# Patient Record
Sex: MALE | Race: WHITE | NOT HISPANIC OR LATINO | Employment: FULL TIME | URBAN - METROPOLITAN AREA
[De-identification: names, ages, dates, MRNs, and addresses within clinical notes are randomized per-mention and may not be internally consistent; named-entity substitution may affect disease eponyms.]

---

## 2023-03-27 LAB
ANION GAP IN SER/PLAS: 13 MMOL/L (ref 10–20)
CALCIUM (MG/DL) IN SER/PLAS: 9.6 MG/DL (ref 8.6–10.3)
CARBON DIOXIDE, TOTAL (MMOL/L) IN SER/PLAS: 34 MMOL/L (ref 21–32)
CHLORIDE (MMOL/L) IN SER/PLAS: 99 MMOL/L (ref 98–107)
CREATININE (MG/DL) IN SER/PLAS: 1.08 MG/DL (ref 0.5–1.3)
ERYTHROCYTE DISTRIBUTION WIDTH (RATIO) BY AUTOMATED COUNT: 13 % (ref 11.5–14.5)
ERYTHROCYTE MEAN CORPUSCULAR HEMOGLOBIN CONCENTRATION (G/DL) BY AUTOMATED: 33.5 G/DL (ref 32–36)
ERYTHROCYTE MEAN CORPUSCULAR VOLUME (FL) BY AUTOMATED COUNT: 98 FL (ref 80–100)
ERYTHROCYTES (10*6/UL) IN BLOOD BY AUTOMATED COUNT: 5.31 X10E12/L (ref 4.5–5.9)
GFR MALE: 75 ML/MIN/1.73M2
GLUCOSE (MG/DL) IN SER/PLAS: 89 MG/DL (ref 74–99)
HEMATOCRIT (%) IN BLOOD BY AUTOMATED COUNT: 52 % (ref 41–52)
HEMOGLOBIN (G/DL) IN BLOOD: 17.4 G/DL (ref 13.5–17.5)
LEUKOCYTES (10*3/UL) IN BLOOD BY AUTOMATED COUNT: 6.7 X10E9/L (ref 4.4–11.3)
PLATELETS (10*3/UL) IN BLOOD AUTOMATED COUNT: 217 X10E9/L (ref 150–450)
POTASSIUM (MMOL/L) IN SER/PLAS: 3.7 MMOL/L (ref 3.5–5.3)
SODIUM (MMOL/L) IN SER/PLAS: 142 MMOL/L (ref 136–145)
UREA NITROGEN (MG/DL) IN SER/PLAS: 28 MG/DL (ref 6–23)

## 2023-04-07 ENCOUNTER — HOSPITAL ENCOUNTER (OUTPATIENT)
Dept: DATA CONVERSION | Facility: HOSPITAL | Age: 68
End: 2023-04-07
Attending: INTERNAL MEDICINE | Admitting: INTERNAL MEDICINE
Payer: COMMERCIAL

## 2023-04-07 DIAGNOSIS — I25.119 ATHEROSCLEROTIC HEART DISEASE OF NATIVE CORONARY ARTERY WITH UNSPECIFIED ANGINA PECTORIS (CMS-HCC): ICD-10-CM

## 2023-04-07 DIAGNOSIS — J44.9 CHRONIC OBSTRUCTIVE PULMONARY DISEASE, UNSPECIFIED (MULTI): ICD-10-CM

## 2023-04-07 DIAGNOSIS — E55.9 VITAMIN D DEFICIENCY, UNSPECIFIED: ICD-10-CM

## 2023-04-07 DIAGNOSIS — I10 ESSENTIAL (PRIMARY) HYPERTENSION: ICD-10-CM

## 2023-04-07 DIAGNOSIS — F17.200 NICOTINE DEPENDENCE, UNSPECIFIED, UNCOMPLICATED: ICD-10-CM

## 2023-04-07 DIAGNOSIS — I20.9 ANGINA PECTORIS, UNSPECIFIED (CMS-HCC): ICD-10-CM

## 2023-04-07 DIAGNOSIS — E78.2 MIXED HYPERLIPIDEMIA: ICD-10-CM

## 2023-04-07 DIAGNOSIS — Z85.46 PERSONAL HISTORY OF MALIGNANT NEOPLASM OF PROSTATE: ICD-10-CM

## 2023-04-07 DIAGNOSIS — I25.10 ATHEROSCLEROTIC HEART DISEASE OF NATIVE CORONARY ARTERY WITHOUT ANGINA PECTORIS: ICD-10-CM

## 2023-04-07 DIAGNOSIS — I44.2 ATRIOVENTRICULAR BLOCK, COMPLETE (MULTI): ICD-10-CM

## 2023-04-07 DIAGNOSIS — I25.2 OLD MYOCARDIAL INFARCTION: ICD-10-CM

## 2023-04-10 LAB
ATRIAL RATE: 71 BPM
P AXIS: 40 DEGREES
P OFFSET: 160 MS
P ONSET: 95 MS
PR INTERVAL: 186 MS
Q ONSET: 188 MS
QRS COUNT: 12 BEATS
QRS DURATION: 174 MS
QT INTERVAL: 456 MS
QTC CALCULATION(BAZETT): 495 MS
QTC FREDERICIA: 482 MS
R AXIS: -80 DEGREES
T AXIS: 73 DEGREES
T OFFSET: 416 MS
VENTRICULAR RATE: 71 BPM

## 2023-06-12 LAB
ALANINE AMINOTRANSFERASE (SGPT) (U/L) IN SER/PLAS: 17 U/L (ref 10–52)
ALBUMIN (G/DL) IN SER/PLAS: 4.4 G/DL (ref 3.4–5)
ALKALINE PHOSPHATASE (U/L) IN SER/PLAS: 59 U/L (ref 33–136)
ANION GAP IN SER/PLAS: 11 MMOL/L (ref 10–20)
ASPARTATE AMINOTRANSFERASE (SGOT) (U/L) IN SER/PLAS: 17 U/L (ref 9–39)
BILIRUBIN TOTAL (MG/DL) IN SER/PLAS: 0.4 MG/DL (ref 0–1.2)
CALCIDIOL (25 OH VITAMIN D3) (NG/ML) IN SER/PLAS: 32 NG/ML
CALCIUM (MG/DL) IN SER/PLAS: 9.8 MG/DL (ref 8.6–10.3)
CARBON DIOXIDE, TOTAL (MMOL/L) IN SER/PLAS: 38 MMOL/L (ref 21–32)
CHLORIDE (MMOL/L) IN SER/PLAS: 96 MMOL/L (ref 98–107)
CHOLESTEROL (MG/DL) IN SER/PLAS: 124 MG/DL (ref 0–199)
CHOLESTEROL IN HDL (MG/DL) IN SER/PLAS: 34 MG/DL
CHOLESTEROL/HDL RATIO: 3.6
CREATININE (MG/DL) IN SER/PLAS: 1.11 MG/DL (ref 0.5–1.3)
ERYTHROCYTE DISTRIBUTION WIDTH (RATIO) BY AUTOMATED COUNT: 13.1 % (ref 11.5–14.5)
ERYTHROCYTE MEAN CORPUSCULAR HEMOGLOBIN CONCENTRATION (G/DL) BY AUTOMATED: 33.1 G/DL (ref 32–36)
ERYTHROCYTE MEAN CORPUSCULAR VOLUME (FL) BY AUTOMATED COUNT: 99 FL (ref 80–100)
ERYTHROCYTES (10*6/UL) IN BLOOD BY AUTOMATED COUNT: 5.28 X10E12/L (ref 4.5–5.9)
GFR MALE: 73 ML/MIN/1.73M2
GLUCOSE (MG/DL) IN SER/PLAS: 100 MG/DL (ref 74–99)
HEMATOCRIT (%) IN BLOOD BY AUTOMATED COUNT: 52.3 % (ref 41–52)
HEMOGLOBIN (G/DL) IN BLOOD: 17.3 G/DL (ref 13.5–17.5)
LDL: 64 MG/DL (ref 0–99)
LEUKOCYTES (10*3/UL) IN BLOOD BY AUTOMATED COUNT: 5.7 X10E9/L (ref 4.4–11.3)
PLATELETS (10*3/UL) IN BLOOD AUTOMATED COUNT: 218 X10E9/L (ref 150–450)
POTASSIUM (MMOL/L) IN SER/PLAS: 4.1 MMOL/L (ref 3.5–5.3)
PROTEIN TOTAL: 7.4 G/DL (ref 6.4–8.2)
SODIUM (MMOL/L) IN SER/PLAS: 141 MMOL/L (ref 136–145)
THYROTROPIN (MIU/L) IN SER/PLAS BY DETECTION LIMIT <= 0.05 MIU/L: 2.07 MIU/L (ref 0.44–3.98)
TRIGLYCERIDE (MG/DL) IN SER/PLAS: 129 MG/DL (ref 0–149)
UREA NITROGEN (MG/DL) IN SER/PLAS: 26 MG/DL (ref 6–23)
VLDL: 26 MG/DL (ref 0–40)

## 2023-06-15 LAB
PROSTATE SPECIFIC AG (NG/ML) IN SER/PLAS: 0.9 NG/ML (ref 0–4)
PROSTATE SPECIFIC AG FREE (NG/ML) IN SER/PLAS: <0.1 NG/ML
PROSTATE SPECIFIC AG FREE/PROSTATE SPECIFIC AG TOTAL IN SER/PLAS: <11 %

## 2023-09-06 VITALS — BODY MASS INDEX: 35.59 KG/M2 | WEIGHT: 248.02 LBS

## 2023-09-14 NOTE — H&P
History & Physical Reviewed:   I have reviewed the History and Physical dated:  27-Mar-2023   History and Physical reviewed and relevant findings noted. Patient examined to review pertinent physical  findings.: No significant changes   Home Medications Reviewed: no changes noted   Allergies Reviewed: no changes noted       Airway/Sedation Assessment:  ·  Mouth Opening OK yes   ·  Neck Flexibility OK yes   ·  Loose Teeth no   ·  Oropharyngeal Classification Class I   ·  ASA PS Classification ASA III   ·  Sedation Plan moderate sedation       ERAS (Enhanced Recovery After Surgery):  ·  ERAS Patient: no     Consent:   COVID-19 Consent:  ·  COVID-19 Risk Consent Surgeon has reviewed key risks related to the risk of keshav COVID-19 and if they contract COVID-19 what the risks are.       Electronic Signatures:  Agustin Esteban)  (Signed 07-Apr-2023 07:46)   Authored: History & Physical Reviewed, Airway/Sedation,  ERAS, Consent, Note Completion      Last Updated: 07-Apr-2023 07:46 by Agustin Esteban)

## 2023-09-16 PROBLEM — J44.9 CHRONIC OBSTRUCTIVE PULMONARY DISEASE (MULTI): Status: ACTIVE | Noted: 2023-09-16

## 2023-09-16 PROBLEM — M48.061 LUMBAR SPINAL STENOSIS: Status: ACTIVE | Noted: 2023-09-16

## 2023-09-16 PROBLEM — N43.0 ENCYSTED HYDROCELE: Status: ACTIVE | Noted: 2023-09-16

## 2023-09-16 PROBLEM — M47.816 DEGENERATIVE JOINT DISEASE (DJD) OF LUMBAR SPINE: Status: ACTIVE | Noted: 2023-09-16

## 2023-09-16 PROBLEM — I87.2 CHRONIC VENOUS INSUFFICIENCY: Status: ACTIVE | Noted: 2023-09-16

## 2023-09-16 PROBLEM — M25.619 DECREASED RANGE OF MOTION OF SHOULDER: Status: ACTIVE | Noted: 2023-09-16

## 2023-09-16 PROBLEM — I25.10 CAD S/P PERCUTANEOUS CORONARY ANGIOPLASTY: Status: ACTIVE | Noted: 2023-09-16

## 2023-09-16 PROBLEM — M54.10 RADICULOPATHY: Status: ACTIVE | Noted: 2023-09-16

## 2023-09-16 PROBLEM — F17.200 CURRENT EVERY DAY SMOKER: Status: ACTIVE | Noted: 2023-09-16

## 2023-09-16 PROBLEM — E78.2 MIXED HYPERLIPIDEMIA: Status: ACTIVE | Noted: 2023-09-16

## 2023-09-16 PROBLEM — I87.2 VENOUS DERMATITIS: Status: ACTIVE | Noted: 2023-09-16

## 2023-09-16 PROBLEM — E66.812 CLASS 2 OBESITY WITH BODY MASS INDEX (BMI) OF 35.0 TO 35.9 IN ADULT: Status: ACTIVE | Noted: 2023-09-16

## 2023-09-16 PROBLEM — I10 ESSENTIAL HYPERTENSION: Status: ACTIVE | Noted: 2023-09-16

## 2023-09-16 PROBLEM — Z85.46 HISTORY OF PROSTATE CANCER: Status: ACTIVE | Noted: 2023-09-16

## 2023-09-16 PROBLEM — M54.9 CHRONIC BACK PAIN: Status: ACTIVE | Noted: 2023-09-16

## 2023-09-16 PROBLEM — G89.29 CHRONIC BACK PAIN: Status: ACTIVE | Noted: 2023-09-16

## 2023-09-16 PROBLEM — E55.9 VITAMIN D DEFICIENCY: Status: ACTIVE | Noted: 2023-09-16

## 2023-09-16 PROBLEM — N40.0 ENLARGED PROSTATE: Status: ACTIVE | Noted: 2023-09-16

## 2023-09-16 PROBLEM — I44.2 COMPLETE HEART BLOCK (MULTI): Status: ACTIVE | Noted: 2023-09-16

## 2023-09-16 PROBLEM — Z95.0 PACEMAKER: Status: ACTIVE | Noted: 2023-09-16

## 2023-09-16 PROBLEM — Z91.199 NONCOMPLIANCE WITH TREATMENT: Status: ACTIVE | Noted: 2023-09-16

## 2023-09-16 PROBLEM — Z95.828 H/O ENDOVASCULAR STENT GRAFT FOR ABDOMINAL AORTIC ANEURYSM: Status: ACTIVE | Noted: 2023-09-16

## 2023-09-16 PROBLEM — I71.40 ABDOMINAL AORTIC ANEURYSM (AAA) (CMS-HCC): Status: ACTIVE | Noted: 2023-09-16

## 2023-09-16 PROBLEM — Z98.61 CAD S/P PERCUTANEOUS CORONARY ANGIOPLASTY: Status: ACTIVE | Noted: 2023-09-16

## 2023-09-16 PROBLEM — E66.9 CLASS 2 OBESITY WITH BODY MASS INDEX (BMI) OF 35.0 TO 35.9 IN ADULT: Status: ACTIVE | Noted: 2023-09-16

## 2023-09-16 PROBLEM — N52.9 ERECTILE DYSFUNCTION: Status: ACTIVE | Noted: 2023-09-16

## 2023-09-16 PROBLEM — I25.2 HISTORY OF MI (MYOCARDIAL INFARCTION): Status: ACTIVE | Noted: 2023-09-16

## 2023-09-16 PROBLEM — M19.012 ARTHRITIS OF LEFT SHOULDER REGION: Status: ACTIVE | Noted: 2023-09-16

## 2023-09-16 PROBLEM — I45.10 RIGHT BUNDLE BRANCH BLOCK (RBBB): Status: ACTIVE | Noted: 2023-09-16

## 2023-09-16 PROBLEM — I87.8 VENOUS STASIS: Status: ACTIVE | Noted: 2023-09-16

## 2023-09-16 RX ORDER — LOSARTAN POTASSIUM 50 MG/1
50 TABLET ORAL DAILY
COMMUNITY
End: 2023-11-09 | Stop reason: ALTCHOICE

## 2023-09-16 RX ORDER — BUDESONIDE AND FORMOTEROL FUMARATE DIHYDRATE 160; 4.5 UG/1; UG/1
AEROSOL RESPIRATORY (INHALATION)
COMMUNITY

## 2023-09-16 RX ORDER — SILDENAFIL CITRATE 20 MG/1
20 TABLET ORAL AS NEEDED
COMMUNITY
Start: 2021-09-08 | End: 2023-11-09 | Stop reason: ALTCHOICE

## 2023-09-16 RX ORDER — LOSARTAN POTASSIUM 50 MG/1
50 TABLET ORAL DAILY
COMMUNITY
End: 2024-01-08

## 2023-09-16 RX ORDER — CARVEDILOL 25 MG/1
25 TABLET ORAL
COMMUNITY

## 2023-09-16 RX ORDER — DEXTROMETHORPHAN HYDROBROMIDE, GUAIFENESIN 5; 100 MG/5ML; MG/5ML
LIQUID ORAL
COMMUNITY

## 2023-09-16 RX ORDER — ACETAMINOPHEN 500 MG
50 TABLET ORAL DAILY
COMMUNITY
End: 2024-03-13

## 2023-09-16 RX ORDER — SILDENAFIL 50 MG/1
50 TABLET, FILM COATED ORAL DAILY PRN
COMMUNITY
End: 2023-12-12 | Stop reason: SDUPTHER

## 2023-09-16 RX ORDER — CETIRIZINE HYDROCHLORIDE 10 MG/1
1 TABLET ORAL DAILY PRN
COMMUNITY

## 2023-09-16 RX ORDER — CYCLOBENZAPRINE HCL 5 MG
5 TABLET ORAL 2 TIMES DAILY PRN
COMMUNITY
End: 2023-11-09 | Stop reason: ALTCHOICE

## 2023-09-16 RX ORDER — ALBUTEROL SULFATE 90 UG/1
2 AEROSOL, METERED RESPIRATORY (INHALATION) EVERY 4 HOURS PRN
COMMUNITY
Start: 2020-12-10 | End: 2023-12-12 | Stop reason: SDUPTHER

## 2023-09-16 RX ORDER — AMLODIPINE BESYLATE 5 MG/1
1 TABLET ORAL NIGHTLY
COMMUNITY
Start: 2023-04-07 | End: 2023-10-24 | Stop reason: SDUPTHER

## 2023-09-16 RX ORDER — IBUPROFEN 200 MG
1-4 TABLET ORAL 3 TIMES DAILY PRN
COMMUNITY

## 2023-09-16 RX ORDER — ATORVASTATIN CALCIUM 40 MG/1
1 TABLET, FILM COATED ORAL NIGHTLY
COMMUNITY
Start: 2020-12-11 | End: 2023-10-10

## 2023-09-16 RX ORDER — MECLIZINE HYDROCHLORIDE 25 MG/1
25 TABLET ORAL 3 TIMES DAILY PRN
COMMUNITY
Start: 2023-01-24 | End: 2023-11-09 | Stop reason: ALTCHOICE

## 2023-09-16 RX ORDER — AMLODIPINE BESYLATE 2.5 MG/1
2.5 TABLET ORAL EVERY EVENING
COMMUNITY
Start: 2023-03-27 | End: 2023-10-24 | Stop reason: SDUPTHER

## 2023-09-16 RX ORDER — NAPROXEN SODIUM 220 MG/1
81 TABLET, FILM COATED ORAL DAILY
COMMUNITY

## 2023-09-16 RX ORDER — TAMSULOSIN HYDROCHLORIDE 0.4 MG/1
0.4 CAPSULE ORAL NIGHTLY
COMMUNITY
End: 2024-01-08

## 2023-09-16 RX ORDER — HYDROCHLOROTHIAZIDE 25 MG/1
25 TABLET ORAL DAILY
COMMUNITY
End: 2023-10-11

## 2023-10-04 DIAGNOSIS — E78.2 MIXED HYPERLIPIDEMIA: ICD-10-CM

## 2023-10-05 DIAGNOSIS — I10 ESSENTIAL HYPERTENSION: ICD-10-CM

## 2023-10-10 RX ORDER — ATORVASTATIN CALCIUM 40 MG/1
40 TABLET, FILM COATED ORAL NIGHTLY
Qty: 90 TABLET | Refills: 3 | Status: SHIPPED | OUTPATIENT
Start: 2023-10-10

## 2023-10-11 RX ORDER — HYDROCHLOROTHIAZIDE 25 MG/1
25 TABLET ORAL DAILY
Qty: 90 TABLET | Refills: 3 | Status: SHIPPED | OUTPATIENT
Start: 2023-10-11

## 2023-10-18 ENCOUNTER — APPOINTMENT (OUTPATIENT)
Dept: CARDIOLOGY | Facility: CLINIC | Age: 68
End: 2023-10-18
Payer: COMMERCIAL

## 2023-10-18 ENCOUNTER — APPOINTMENT (OUTPATIENT)
Dept: CARDIOLOGY | Facility: HOSPITAL | Age: 68
End: 2023-10-18
Payer: COMMERCIAL

## 2023-10-24 DIAGNOSIS — I10 ESSENTIAL HYPERTENSION: ICD-10-CM

## 2023-10-24 RX ORDER — AMLODIPINE BESYLATE 5 MG/1
5 TABLET ORAL NIGHTLY
Qty: 90 TABLET | Refills: 3 | Status: SHIPPED | OUTPATIENT
Start: 2023-10-24 | End: 2024-10-23

## 2023-10-24 RX ORDER — AMLODIPINE BESYLATE 5 MG/1
TABLET ORAL
Qty: 90 TABLET | Refills: 3 | Status: CANCELLED | OUTPATIENT
Start: 2023-10-24

## 2023-11-06 ENCOUNTER — APPOINTMENT (OUTPATIENT)
Dept: CARDIOLOGY | Facility: CLINIC | Age: 68
End: 2023-11-06
Payer: COMMERCIAL

## 2023-11-09 ENCOUNTER — OFFICE VISIT (OUTPATIENT)
Dept: CARDIOLOGY | Facility: CLINIC | Age: 68
End: 2023-11-09
Payer: COMMERCIAL

## 2023-11-09 VITALS
BODY MASS INDEX: 37.47 KG/M2 | DIASTOLIC BLOOD PRESSURE: 78 MMHG | WEIGHT: 247.2 LBS | HEART RATE: 80 BPM | HEIGHT: 68 IN | SYSTOLIC BLOOD PRESSURE: 130 MMHG

## 2023-11-09 DIAGNOSIS — I71.43 INFRARENAL ABDOMINAL AORTIC ANEURYSM (AAA) WITHOUT RUPTURE (CMS-HCC): ICD-10-CM

## 2023-11-09 DIAGNOSIS — F17.200 CURRENT EVERY DAY SMOKER: ICD-10-CM

## 2023-11-09 DIAGNOSIS — I87.2 CHRONIC VENOUS INSUFFICIENCY OF LOWER EXTREMITY: ICD-10-CM

## 2023-11-09 DIAGNOSIS — I10 ESSENTIAL HYPERTENSION: ICD-10-CM

## 2023-11-09 DIAGNOSIS — Z95.828 H/O ENDOVASCULAR STENT GRAFT FOR ABDOMINAL AORTIC ANEURYSM: ICD-10-CM

## 2023-11-09 DIAGNOSIS — I25.10 CAD S/P PERCUTANEOUS CORONARY ANGIOPLASTY: ICD-10-CM

## 2023-11-09 DIAGNOSIS — E78.2 MIXED HYPERLIPIDEMIA: ICD-10-CM

## 2023-11-09 DIAGNOSIS — J44.9 CHRONIC OBSTRUCTIVE PULMONARY DISEASE, UNSPECIFIED COPD TYPE (MULTI): ICD-10-CM

## 2023-11-09 DIAGNOSIS — Z98.61 CAD S/P PERCUTANEOUS CORONARY ANGIOPLASTY: ICD-10-CM

## 2023-11-09 DIAGNOSIS — Z95.0 PACEMAKER: ICD-10-CM

## 2023-11-09 PROCEDURE — 3008F BODY MASS INDEX DOCD: CPT | Performed by: INTERNAL MEDICINE

## 2023-11-09 PROCEDURE — 3075F SYST BP GE 130 - 139MM HG: CPT | Performed by: INTERNAL MEDICINE

## 2023-11-09 PROCEDURE — 1159F MED LIST DOCD IN RCRD: CPT | Performed by: INTERNAL MEDICINE

## 2023-11-09 PROCEDURE — 99214 OFFICE O/P EST MOD 30 MIN: CPT | Performed by: INTERNAL MEDICINE

## 2023-11-09 PROCEDURE — 3078F DIAST BP <80 MM HG: CPT | Performed by: INTERNAL MEDICINE

## 2023-11-09 NOTE — PROGRESS NOTES
CARDIOLOGY OFFICE VISIT      CHIEF COMPLAINT      HISTORY OF PRESENT ILLNESS  The patient states that he has been doing well.  He denies chest discomfort or symptoms of myocardial ischemia.  He has not used nitroglycerin.  He has his usual chronic dyspnea secondary to COPD.  He denies any worsening of this.  He denies palpitations and syncope.  He denies any problems medications.  I talked him about getting an ultrasound of his abdominal aorta to follow-up his endovascular stent graft repair of his infrarenal abdominal aneurysm but he states that he does not wish to have this done at this time since its always been good when he sat checked before and he is getting to retire he does not want to pay the cost.      Impression:  Permanent pacemaker  Coronary artery disease, new onset angina, progressive and disabling, recommend cardiac catheterization and possible PCI, please see above  Non-STEMI May 2020  PCI with CLARI of circumflex coronary 5/29/2020  Endovascular stent graft repair of infrarenal abdominal aneurysm and right common iliac artery aneurysm on 8/21/2020  Hypertension  Hyperlipidemia  Tobacco abuse  COPD  Chronic venous insufficiency of lower extremities bilaterally  Family history of CAD  Obesity  History of prostate cancer, status post radiation therapy        Please excuse any errors in grammar or translation related to this dictation. Voice recognition software was utilized to prepare this document.     Past Medical History  Past Medical History:   Diagnosis Date    Encounter for general adult medical examination without abnormal findings     Encounter for wellness examination    Encounter for general adult medical examination without abnormal findings 08/08/2022    Encounter for wellness examination    Encounter for immunization     Encounter for immunization    Encounter for routine child health examination without abnormal findings     Well child examination    Encounter for screening for malignant  neoplasm of colon     Screening for colon cancer    Encounter for screening for malignant neoplasm of colon     Colon cancer screening    Encounter for screening for malignant neoplasm of prostate     Screening PSA (prostate specific antigen)    Hyperventilation 05/13/2022    Labored breathing    Obesity, unspecified 10/12/2022    Class 1 obesity with body mass index (BMI) of 34.0 to 34.9 in adult    Pain in right hip 04/04/2022    Hip pain, bilateral    Pain in right knee 03/16/2022    Pain in both knees    Patient's noncompliance with other medical treatment and regimen due to unspecified reason     Noncompliance with treatment    Patient's noncompliance with other medical treatment and regimen due to unspecified reason 05/16/2022    Noncompliance by declining service    Personal history of malignant neoplasm of prostate     History of malignant neoplasm of prostate    Personal history of malignant neoplasm of prostate 03/16/2022    History of prostate cancer    Personal history of other diseases of the circulatory system     History of second degree heart block    Personal history of other diseases of the circulatory system 04/11/2022    History of uncontrolled hypertension    Personal history of other endocrine, nutritional and metabolic disease 03/16/2022    History of obesity    Personal history of other specified conditions 06/03/2022    History of syncope       Social History  Social History     Tobacco Use    Smoking status: Every Day     Packs/day: 1     Types: Cigarettes     Start date: 1970    Smokeless tobacco: Never   Substance Use Topics    Alcohol use: Yes     Comment: socially 1-2x week    Drug use: Never       Family History     Family History   Problem Relation Name Age of Onset    Coronary artery disease Mother      Other (Cardiac Pacemaker) Mother      Coronary artery disease Father      Other (pacemaker) Father      Coronary artery disease Sister          Allergies:  No Known Allergies      Outpatient Medications:  Current Outpatient Medications   Medication Instructions    acetaminophen (Tylenol 8 HOUR) 650 mg ER tablet TAKE AS NEEDED AS DIRECTED    albuterol 90 mcg/actuation inhaler 2 puffs, inhalation, Every 4 hours PRN    amLODIPine (NORVASC) 5 mg, oral, Nightly, (Increase in dose)    aspirin 81 mg, oral, Daily    atorvastatin (LIPITOR) 40 mg, oral, Nightly    budesonide-formoteroL (Symbicort) 160-4.5 mcg/actuation inhaler Symbicort 160 mcg-4.5 mcg/inh inhalation aerosol   Quantity: 0   Refills: 0   Ordered: 24-Jan-2023  Mary Chang Generic Substitution Allowed    carvedilol (COREG) 25 mg, oral, 2 times daily with meals    cetirizine (ZyrTEC) 10 mg tablet 1 tablet, oral, Daily PRN    cholecalciferol (VITAMIN D-3) 50 mcg, oral, Daily    Flomax 0.4 mg, oral, Nightly    hydroCHLOROthiazide (HYDRODIURIL) 25 mg, oral, Daily    ibuprofen 200 mg tablet 1-4 tablets, oral, 3 times daily PRN    losartan (COZAAR) 50 mg, oral, 2 times daily    sildenafil (VIAGRA) 50 mg, oral, Daily PRN          REVIEW OF SYSTEMS  Review of Systems   All other systems reviewed and are negative.        VITALS  Vitals:    11/09/23 1548   BP: 130/78   Pulse: 80       PHYSICAL EXAM  Constitutional:       Appearance: Healthy appearance. Not in distress.   Neck:      Vascular: No JVR. JVD normal.   Pulmonary:      Effort: Pulmonary effort is normal.      Breath sounds: No wheezing. No rhonchi. No rales.      Comments: Decreased bilateral breath sounds  Chest:      Chest wall: Not tender to palpatation.   Cardiovascular:      PMI at left midclavicular line. Normal rate. Regular rhythm. Normal S1. Normal S2.       Murmurs: There is no murmur.      No gallop.  No click. No rub.   Pulses:     Intact distal pulses.   Edema:     Peripheral edema absent.   Abdominal:      General: Bowel sounds are normal.      Palpations: Abdomen is soft.      Tenderness: There is no abdominal tenderness.   Musculoskeletal: Normal range of motion.          General: No tenderness. Skin:     General: Skin is warm and dry.   Neurological:      General: No focal deficit present.      Mental Status: Alert and oriented to person, place and time.           ASSESSMENT AND PLAN  Diagnoses and all orders for this visit:  Pacemaker  CAD S/P percutaneous coronary angioplasty  Essential hypertension  Mixed hyperlipidemia  Chronic obstructive pulmonary disease, unspecified COPD type (CMS/HCC)  Current every day smoker  Chronic venous insufficiency of lower extremity  Infrarenal abdominal aortic aneurysm (AAA) without rupture (CMS/[facility])  H/O endovascular stent graft for abdominal aortic aneurysm      [unfilled]

## 2023-12-06 ENCOUNTER — LAB (OUTPATIENT)
Dept: LAB | Facility: LAB | Age: 68
End: 2023-12-06
Payer: COMMERCIAL

## 2023-12-06 ENCOUNTER — TELEPHONE (OUTPATIENT)
Dept: PRIMARY CARE | Facility: CLINIC | Age: 68
End: 2023-12-06

## 2023-12-06 DIAGNOSIS — I10 ESSENTIAL (PRIMARY) HYPERTENSION: Primary | ICD-10-CM

## 2023-12-06 LAB
ALBUMIN SERPL BCP-MCNC: 4.3 G/DL (ref 3.4–5)
ALP SERPL-CCNC: 59 U/L (ref 33–136)
ALT SERPL W P-5'-P-CCNC: 16 U/L (ref 10–52)
ANION GAP SERPL CALC-SCNC: 7 MMOL/L (ref 10–20)
AST SERPL W P-5'-P-CCNC: 13 U/L (ref 9–39)
BILIRUB SERPL-MCNC: 0.5 MG/DL (ref 0–1.2)
BUN SERPL-MCNC: 19 MG/DL (ref 6–23)
CALCIUM SERPL-MCNC: 9.5 MG/DL (ref 8.6–10.3)
CHLORIDE SERPL-SCNC: 98 MMOL/L (ref 98–107)
CO2 SERPL-SCNC: 40 MMOL/L (ref 21–32)
CREAT SERPL-MCNC: 1.06 MG/DL (ref 0.5–1.3)
GFR SERPL CREATININE-BSD FRML MDRD: 77 ML/MIN/1.73M*2
GLUCOSE SERPL-MCNC: 106 MG/DL (ref 74–99)
POTASSIUM SERPL-SCNC: 4 MMOL/L (ref 3.5–5.3)
PROT SERPL-MCNC: 6.8 G/DL (ref 6.4–8.2)
SODIUM SERPL-SCNC: 141 MMOL/L (ref 136–145)

## 2023-12-06 PROCEDURE — 80053 COMPREHEN METABOLIC PANEL: CPT

## 2023-12-06 PROCEDURE — 36415 COLL VENOUS BLD VENIPUNCTURE: CPT

## 2023-12-12 ENCOUNTER — OFFICE VISIT (OUTPATIENT)
Dept: PRIMARY CARE | Facility: CLINIC | Age: 68
End: 2023-12-12
Payer: COMMERCIAL

## 2023-12-12 VITALS
WEIGHT: 250 LBS | OXYGEN SATURATION: 91 % | DIASTOLIC BLOOD PRESSURE: 84 MMHG | BODY MASS INDEX: 37.03 KG/M2 | HEIGHT: 69 IN | SYSTOLIC BLOOD PRESSURE: 134 MMHG | TEMPERATURE: 98 F | HEART RATE: 76 BPM

## 2023-12-12 DIAGNOSIS — I10 ESSENTIAL HYPERTENSION: ICD-10-CM

## 2023-12-12 DIAGNOSIS — J44.9 CHRONIC OBSTRUCTIVE PULMONARY DISEASE, UNSPECIFIED COPD TYPE (MULTI): ICD-10-CM

## 2023-12-12 DIAGNOSIS — E66.09 CLASS 2 OBESITY DUE TO EXCESS CALORIES WITHOUT SERIOUS COMORBIDITY WITH BODY MASS INDEX (BMI) OF 35.0 TO 35.9 IN ADULT: ICD-10-CM

## 2023-12-12 DIAGNOSIS — N52.9 ERECTILE DYSFUNCTION, UNSPECIFIED ERECTILE DYSFUNCTION TYPE: ICD-10-CM

## 2023-12-12 DIAGNOSIS — E78.2 MIXED HYPERLIPIDEMIA: Primary | ICD-10-CM

## 2023-12-12 DIAGNOSIS — Z85.46 HISTORY OF PROSTATE CANCER: ICD-10-CM

## 2023-12-12 PROCEDURE — 3008F BODY MASS INDEX DOCD: CPT | Performed by: INTERNAL MEDICINE

## 2023-12-12 PROCEDURE — 3079F DIAST BP 80-89 MM HG: CPT | Performed by: INTERNAL MEDICINE

## 2023-12-12 PROCEDURE — 99214 OFFICE O/P EST MOD 30 MIN: CPT | Performed by: INTERNAL MEDICINE

## 2023-12-12 PROCEDURE — 3075F SYST BP GE 130 - 139MM HG: CPT | Performed by: INTERNAL MEDICINE

## 2023-12-12 PROCEDURE — 1159F MED LIST DOCD IN RCRD: CPT | Performed by: INTERNAL MEDICINE

## 2023-12-12 PROCEDURE — 1160F RVW MEDS BY RX/DR IN RCRD: CPT | Performed by: INTERNAL MEDICINE

## 2023-12-12 RX ORDER — ALBUTEROL SULFATE 90 UG/1
2 AEROSOL, METERED RESPIRATORY (INHALATION) EVERY 4 HOURS PRN
Qty: 18 G | Refills: 1 | Status: SHIPPED | OUTPATIENT
Start: 2023-12-12

## 2023-12-12 RX ORDER — SILDENAFIL 50 MG/1
50 TABLET, FILM COATED ORAL DAILY PRN
Qty: 10 TABLET | Refills: 1 | Status: SHIPPED | OUTPATIENT
Start: 2023-12-12

## 2023-12-12 ASSESSMENT — ENCOUNTER SYMPTOMS
DEPRESSION: 0
OCCASIONAL FEELINGS OF UNSTEADINESS: 1
LOSS OF SENSATION IN FEET: 1

## 2023-12-12 ASSESSMENT — PATIENT HEALTH QUESTIONNAIRE - PHQ9
2. FEELING DOWN, DEPRESSED OR HOPELESS: NOT AT ALL
1. LITTLE INTEREST OR PLEASURE IN DOING THINGS: NOT AT ALL
SUM OF ALL RESPONSES TO PHQ9 QUESTIONS 1 AND 2: 0

## 2023-12-12 NOTE — PROGRESS NOTES
"Subjective   Patient ID: Bladimir Brunner Jr. is a 67 y.o. male who presents for Follow-up (Patient is in office today for a 4 month follow-up) and Med Management (Patient like a new prescription for prednisone pack. Patient advises that his chest is filling full of fluid along with his COPD and the prednisone pack has helped before in this regards. Patient has concerns with his inhalers, he states that the ventolin helps more then the  proair and would like to continue with the ventolin. ).    HPI   67-year-old male with a past medical history of hypertension hyperlipidemia COPD history of prostate cancer here for follow-up no complaints need refill on medications and his placard has to be renewed  Review of Systems  REVIEW OF SYSTEMS:  General:  Denies significant weight changes, fever, chills or weakness.  SKIN: Denies any rash or change in moles.  HEENT:  No vision or hearing changes. No headache. No vertigo, No tinnitus.   GI:  No loss of appetite. No change in bowel habit. No abdominal pain. No blood in stool.  GUR: No dysuria. No hematoma, No fever. No incontinence.  Respiratory:  No cough or shortness of breath.  CNS:  No memory or mood changes. No gait disturbance. No focal weakness. No tremors. No tingling or number of extremities.   ENDO: No cold intolerance. No fatigue.    Objective   /84 (BP Location: Left arm, Patient Position: Sitting, BP Cuff Size: Large adult)   Pulse 76   Temp 36.7 °C (98 °F) (Temporal)   Ht 1.753 m (5' 9\")   Wt 113 kg (250 lb)   SpO2 91% Comment: RA  BMI 36.92 kg/m²     Physical Exam  PHYSICAL EXAM LONG:  Vitals:  Per [employer].  General Appearance:  Normal-built, well-nourished  with no apparent distress.  Skin:  Normal turgor.  No rash.  Head:  Normocephalic, atraumatic.  Eyes:  Pupils are equal, round, and reactive to light and accommodation.  Extraocular movements are intact.  No pallor of conjunctivae.  Mouth has moist oral mucosa.  Pharynx appears normal.  No " erythema.  Nose:  Nasal mucosa normal.  Turbinates are within normal limits.  Ears:  Bilateral auditory ear canals are normal.  Bilateral tympanic membranes are normal and visible.  Neck:  Supple.  No JVD.  No carotid bruit.  No thyromegaly. No cervical lymphadenopathy.   Chest:  Bilaterally good air entry and bilaterally clear to auscultation.  No wheezing.  No crackles.  Heart:  Regular rate and rhythm.  S1, S2 positive.  No murmur.  Abdomen:  Soft and nontender.  Bowel sounds are positive.  No organomegaly.  Extremities:  Bilaterally no pedal pitting edema.  Bilaterally 2+ dorsalis pedis pulses.  Neuro Exam:  Cranial nerves from II to XII intact.  No facial droop.  Tongue at midline.  Facial sensation intact to light touch and pain sensation.  Motor strength 5/5 in upper and lower extremities.  Sensation is grossly intact to light touch and pain sensation.  Deep tendon reflexes are bilaterally symmetric in upper and lower extremities and within normal limits, 2+.  No cerebellar signs.  Finger-to-nose intact.        Assessment/Plan     Hypertension stable advised DASH diet continue medication refill all of his medications    COPD stable gave the disability placard for 3 years    Hyperlipidemia stable strongly advised lifestyle modification diet exercise lose weight gave complete blood work to be done I will see him in August with a complete blood work for wellness visit

## 2023-12-13 ENCOUNTER — HOSPITAL ENCOUNTER (OUTPATIENT)
Dept: CARDIOLOGY | Facility: HOSPITAL | Age: 68
Discharge: HOME | End: 2023-12-13
Payer: COMMERCIAL

## 2023-12-13 ENCOUNTER — OFFICE VISIT (OUTPATIENT)
Dept: CARDIOLOGY | Facility: CLINIC | Age: 68
End: 2023-12-13
Payer: COMMERCIAL

## 2023-12-13 VITALS
BODY MASS INDEX: 36.43 KG/M2 | DIASTOLIC BLOOD PRESSURE: 92 MMHG | HEART RATE: 80 BPM | WEIGHT: 246 LBS | HEIGHT: 69 IN | SYSTOLIC BLOOD PRESSURE: 142 MMHG

## 2023-12-13 DIAGNOSIS — Z95.0 PACEMAKER: ICD-10-CM

## 2023-12-13 DIAGNOSIS — I44.2 COMPLETE HEART BLOCK (MULTI): ICD-10-CM

## 2023-12-13 DIAGNOSIS — Z95.0 PACEMAKER: Primary | ICD-10-CM

## 2023-12-13 DIAGNOSIS — I25.10 CAD S/P PERCUTANEOUS CORONARY ANGIOPLASTY: ICD-10-CM

## 2023-12-13 DIAGNOSIS — Z98.61 CAD S/P PERCUTANEOUS CORONARY ANGIOPLASTY: ICD-10-CM

## 2023-12-13 DIAGNOSIS — I45.10 RIGHT BUNDLE BRANCH BLOCK (RBBB): ICD-10-CM

## 2023-12-13 DIAGNOSIS — F17.200 CURRENT EVERY DAY SMOKER: ICD-10-CM

## 2023-12-13 PROCEDURE — 3008F BODY MASS INDEX DOCD: CPT | Performed by: INTERNAL MEDICINE

## 2023-12-13 PROCEDURE — 3077F SYST BP >= 140 MM HG: CPT | Performed by: INTERNAL MEDICINE

## 2023-12-13 PROCEDURE — 93000 ELECTROCARDIOGRAM COMPLETE: CPT | Performed by: INTERNAL MEDICINE

## 2023-12-13 PROCEDURE — 93290 INTERROG DEV EVAL ICPMS IP: CPT | Performed by: INTERNAL MEDICINE

## 2023-12-13 PROCEDURE — 93280 PM DEVICE PROGR EVAL DUAL: CPT | Performed by: INTERNAL MEDICINE

## 2023-12-13 PROCEDURE — 93280 PM DEVICE PROGR EVAL DUAL: CPT

## 2023-12-13 PROCEDURE — 1160F RVW MEDS BY RX/DR IN RCRD: CPT | Performed by: INTERNAL MEDICINE

## 2023-12-13 PROCEDURE — 1159F MED LIST DOCD IN RCRD: CPT | Performed by: INTERNAL MEDICINE

## 2023-12-13 PROCEDURE — 3080F DIAST BP >= 90 MM HG: CPT | Performed by: INTERNAL MEDICINE

## 2023-12-13 PROCEDURE — 99214 OFFICE O/P EST MOD 30 MIN: CPT | Performed by: INTERNAL MEDICINE

## 2023-12-13 NOTE — PROGRESS NOTES
CARDIOLOGY OFFICE VISIT      CHIEF COMPLAINT  Chief Complaint   Patient presents with    Follow-up     Patient presented today for a 6 month follow up.         HISTORY OF PRESENT ILLNESS  HPI   68-year-old  male who is followed for second-degree AV block and transient complete heart block status post dual-chamber pacemaker implant on December 8, 2020, coronary artery disease status post angioplasty with drug-eluting stent to the mid circumflex coronary artery on May 29, 2020 with 10 to 30% mid LAD and 50% mid RCA stenosis managed medically and normal left ventricular function. He underwent endovascular stent graft repair of the infra renal abdominal aortic aneurysm and right common iliac aneurysm on August 21, 2020.    Since the last visit, he has been doing well.  He denies any symptoms of chest pain or shortness of breath or palpitations.    EKG performed today shows atrial sensed rhythm ventricular paced rhythm at rate of 80 bpm QRS duration 170 ms QT corrected 509 ms.  Rhythm strip shows the same pattern.    Patient had a device interrogation performed today at the device clinic and it shows battery longevity 8 years.  No evidence of atrial fibrillation.  Appropriate sensing, capture and impedances of all leads.          Past Medical History    Past Medical History:   Diagnosis Date    Encounter for general adult medical examination without abnormal findings     Encounter for wellness examination    Encounter for general adult medical examination without abnormal findings 08/08/2022    Encounter for wellness examination    Encounter for immunization     Encounter for immunization    Encounter for routine child health examination without abnormal findings     Well child examination    Encounter for screening for malignant neoplasm of colon     Screening for colon cancer    Encounter for screening for malignant neoplasm of colon     Colon cancer screening    Encounter for screening for malignant neoplasm  of prostate     Screening PSA (prostate specific antigen)    Hyperventilation 05/13/2022    Labored breathing    Obesity, unspecified 10/12/2022    Class 1 obesity with body mass index (BMI) of 34.0 to 34.9 in adult    Pain in right hip 04/04/2022    Hip pain, bilateral    Pain in right knee 03/16/2022    Pain in both knees    Patient's noncompliance with other medical treatment and regimen due to unspecified reason     Noncompliance with treatment    Patient's noncompliance with other medical treatment and regimen due to unspecified reason 05/16/2022    Noncompliance by declining service    Personal history of malignant neoplasm of prostate     History of malignant neoplasm of prostate    Personal history of malignant neoplasm of prostate 03/16/2022    History of prostate cancer    Personal history of other diseases of the circulatory system     History of second degree heart block    Personal history of other diseases of the circulatory system 04/11/2022    History of uncontrolled hypertension    Personal history of other endocrine, nutritional and metabolic disease 03/16/2022    History of obesity    Personal history of other specified conditions 06/03/2022    History of syncope       Social History  Social History     Tobacco Use    Smoking status: Every Day     Packs/day: 1     Types: Cigarettes     Start date: 1970    Smokeless tobacco: Never   Substance Use Topics    Alcohol use: Yes     Comment: socially 1-2x week    Drug use: Never       Family History     Family History   Problem Relation Name Age of Onset    Coronary artery disease Mother      Other (Cardiac Pacemaker) Mother      Coronary artery disease Father      Other (pacemaker) Father      Coronary artery disease Sister          Allergies:  No Known Allergies     Outpatient Medications:  Current Outpatient Medications   Medication Instructions    acetaminophen (Tylenol 8 HOUR) 650 mg ER tablet TAKE AS NEEDED AS DIRECTED    albuterol 90 mcg/actuation  inhaler 2 puffs, inhalation, Every 4 hours PRN    amLODIPine (NORVASC) 5 mg, oral, Nightly, (Increase in dose)    aspirin 81 mg, oral, Daily    atorvastatin (LIPITOR) 40 mg, oral, Nightly    budesonide-formoteroL (Symbicort) 160-4.5 mcg/actuation inhaler Symbicort 160 mcg-4.5 mcg/inh inhalation aerosol   Quantity: 0   Refills: 0   Ordered: 24-Jan-2023  Mary Chang Generic Substitution Allowed    carvedilol (COREG) 25 mg, oral, 2 times daily with meals    cetirizine (ZyrTEC) 10 mg tablet 1 tablet, oral, Daily PRN    cholecalciferol (VITAMIN D-3) 50 mcg, oral, Daily    Flomax 0.4 mg, oral, Nightly    hydroCHLOROthiazide (HYDRODIURIL) 25 mg, oral, Daily    ibuprofen 200 mg tablet 1-4 tablets, oral, 3 times daily PRN    losartan (COZAAR) 50 mg, oral, Daily    sildenafil (VIAGRA) 50 mg, oral, Daily PRN          REVIEW OF SYSTEMS  Review of Systems   All other systems reviewed and are negative.        VITALS  Vitals:    12/13/23 1630   BP: (!) 142/92   Pulse: 80       PHYSICAL EXAM  Vitals reviewed.   Constitutional:       Appearance: Normal and healthy appearance. Well-developed and not in distress.   Eyes:      Conjunctiva/sclera: Conjunctivae normal.      Pupils: Pupils are equal, round, and reactive to light.   Neck:      Vascular: No JVR. JVD normal.   Pulmonary:      Effort: Pulmonary effort is normal.      Breath sounds: Normal breath sounds. No wheezing. No rhonchi. No rales.   Chest:      Chest wall: Not tender to palpatation.          Comments: Left sided device pocket- healed and well approximated. No lump or hematoma     Cardiovascular:      PMI at left midclavicular line. Normal rate. Regular rhythm. Normal S1. Normal S2.       Murmurs: There is no murmur.      No gallop.  No click. No rub.   Pulses:     Intact distal pulses.   Edema:     Peripheral edema absent.   Abdominal:      Tenderness: There is no abdominal tenderness.   Musculoskeletal: Normal range of motion.         General: No tenderness.       Cervical back: Normal range of motion. Skin:     General: Skin is warm and dry.   Neurological:      General: No focal deficit present.      Mental Status: Alert and oriented to person, place and time.   Psychiatric:         Behavior: Behavior is cooperative.           ASSESSMENT AND PLAN    Clinical impressions:  1. Second-degree AV block and transient complete heart block status post dual-chamber pacemaker implant ([employer] Saranya XT DR MRI) on December 8, 2020.  2. Coronary artery disease status post PTCA with drug-eluting stent to the mid circumflex coronary artery on May 29, 2020 with left heart catheterization dated April 7, 2023 revealing less than 10% left main, 10 to 30% mid LAD, patent circumflex stents, 10% mid circumflex, 50% mid RCA stenosis with recommendation for medical management.  3. Normal left ventricular function per 2D echocardiogram dated April 7, 2023 with an ejection fraction of 55% and left heart catheterization dated April 7, 2023 revealing an ejection fraction of 60%..  4. Endovascular stent graft repair of the infrarenal abdominal aortic aneurysm and right common iliac aneurysm on August 21, 2020.  5. Valvular heart disease consisting of trace to mild TR, mild mitral annular calcification with trace to mild MR per 2D echocardiogram dated April 7, 2023.  6. Hypertension controlled     7. Dyslipidemia on statin.  8. Chronic obstructive pulmonary disease with ongoing tobacco use.  9. History of prostate cancer status post radiation therapy.  10. Obesity with a BMI of 35.01.  11. Chronic venous insufficiency of the lower extremities bilaterally.  12. Syncope secondary to hypotension.  13. Scrotal edema under evaluation.      Plan recommendations    From the electrophysiologist on point he is doing well.  Will continue with current medical therapy.    Continue with beta-blocker therapy.    Follow device clinic as scheduled.    Follow-up in my office every 6 months or sooner if needed.    Risk  factor modification and lifestyle modification discussed with patient. Diet , exercise and hydration discussed with patient.    I have personally review with patient during this office visit, laboratory data, echocardiogram results, stress test results, Holter-event monitor results prior and after the last electrophysiology visit. All questions has been answered.    Please excuse any errors in grammar or translation related to this dictation.  Voice recognition software was utilized to prepare this document.

## 2023-12-13 NOTE — PATIENT INSTRUCTIONS
Patient to follow up in 6 months with Dr. Miah Swan MD    Office will call to arrange and alternate your visits with Dr. Agustin Edmondson MD      No other changes today.   Encouraged to quit smoking- heart healthy education given today.     Continue same medications and treatments.   Patient educated on proper medication use.   Patient educated on risk factor modification.   Please bring any lab results from other providers / physicians to your next appointment.     Please bring all medicines, vitamins, and herbal supplements with you when you come to the office.     Prescriptions will not be filled unless you are compliant with your follow up appointments or have a follow up appointment scheduled as per instruction of your physician. Refills should be requested at the time of your visit.    IHans RN am scribing for and in the presence of Dr. Miah Swan MD     Quality 110: Preventive Care And Screening: Influenza Immunization: Influenza Immunization previously received during influenza season Quality 431: Preventive Care And Screening: Unhealthy Alcohol Use - Screening: Patient not identified as an unhealthy alcohol user when screened for unhealthy alcohol use using a systematic screening method Detail Level: Detailed Quality 226: Preventive Care And Screening: Tobacco Use: Screening And Cessation Intervention: Patient screened for tobacco use and is an ex/non-smoker Additional Notes: COVID-19 vaccine received

## 2024-01-08 DIAGNOSIS — N40.0 BENIGN PROSTATIC HYPERPLASIA WITHOUT LOWER URINARY TRACT SYMPTOMS: ICD-10-CM

## 2024-01-08 DIAGNOSIS — I10 ESSENTIAL (PRIMARY) HYPERTENSION: ICD-10-CM

## 2024-01-08 RX ORDER — LOSARTAN POTASSIUM 50 MG/1
50 TABLET ORAL 2 TIMES DAILY
Qty: 180 TABLET | Refills: 1 | Status: SHIPPED | OUTPATIENT
Start: 2024-01-08

## 2024-01-08 RX ORDER — TAMSULOSIN HYDROCHLORIDE 0.4 MG/1
0.4 CAPSULE ORAL DAILY
Qty: 90 CAPSULE | Refills: 1 | Status: SHIPPED | OUTPATIENT
Start: 2024-01-08

## 2024-01-08 NOTE — TELEPHONE ENCOUNTER
Pharmacy request     Last ov 12/12/2023    Future Appointments         Date / Time Provider Department Dept Phone     3/20/2024 2:15 PM [facility] [place] [id number] ECHO/VASC 4 [facility] [phone]     6/19/2024 10:15 AM Agustin Murphy MD [facility] [phone]     8/26/2024 10:30 AM Faith Martinez MD [facility] [phone]     12/11/2024 10:00 AM (Arrive by 9:45 AM) [facility] [phone]     12/11/2024 10:45 AM Miah Swan MD [facility] [phone]

## 2024-03-11 DIAGNOSIS — E55.9 VITAMIN D DEFICIENCY, UNSPECIFIED: ICD-10-CM

## 2024-03-13 RX ORDER — ACETAMINOPHEN 500 MG
TABLET ORAL DAILY
Qty: 90 CAPSULE | Refills: 1 | Status: SHIPPED | OUTPATIENT
Start: 2024-03-13

## 2024-03-20 ENCOUNTER — HOSPITAL ENCOUNTER (OUTPATIENT)
Dept: CARDIOLOGY | Facility: CLINIC | Age: 69
Discharge: HOME | End: 2024-03-20
Payer: COMMERCIAL

## 2024-03-20 ENCOUNTER — HOSPITAL ENCOUNTER (OUTPATIENT)
Dept: CARDIOLOGY | Facility: HOSPITAL | Age: 69
Discharge: HOME | End: 2024-03-20
Payer: COMMERCIAL

## 2024-03-20 DIAGNOSIS — Z95.0 PACEMAKER: ICD-10-CM

## 2024-03-20 DIAGNOSIS — Z95.828 H/O ENDOVASCULAR STENT GRAFT FOR ABDOMINAL AORTIC ANEURYSM: ICD-10-CM

## 2024-03-20 DIAGNOSIS — I71.43 INFRARENAL ABDOMINAL AORTIC ANEURYSM (AAA) WITHOUT RUPTURE (CMS-HCC): ICD-10-CM

## 2024-03-20 PROCEDURE — 93296 REM INTERROG EVL PM/IDS: CPT

## 2024-03-20 PROCEDURE — 93978 VASCULAR STUDY: CPT | Performed by: INTERNAL MEDICINE

## 2024-03-20 PROCEDURE — 93294 REM INTERROG EVL PM/LDLS PM: CPT | Performed by: INTERNAL MEDICINE

## 2024-03-25 ENCOUNTER — TELEPHONE (OUTPATIENT)
Dept: CARDIOLOGY | Facility: CLINIC | Age: 69
End: 2024-03-25
Payer: COMMERCIAL

## 2024-03-25 NOTE — TELEPHONE ENCOUNTER
Pt did not answer but I left message stating I was going to call wife and send results to [facility]. Wife answered and verbalized understanding of what I read to her.

## 2024-03-25 NOTE — TELEPHONE ENCOUNTER
----- Message from Constance Pace LPN sent at 3/25/2024 11:10 AM EDT -----    ----- Message -----  From: Agustin Murphy MD  Sent: 3/25/2024  10:44 AM EDT  To: Constance Pace LPN    AHMET looks good on ultrasound  ----- Message -----  From: Interface, Syngo - Cardiology Results In  Sent: 3/22/2024   3:56 PM EDT  To: Agustin Murphy MD

## 2024-06-19 ENCOUNTER — APPOINTMENT (OUTPATIENT)
Dept: CARDIOLOGY | Facility: CLINIC | Age: 69
End: 2024-06-19
Payer: MEDICARE

## 2024-06-19 VITALS
WEIGHT: 243.8 LBS | HEIGHT: 70 IN | HEART RATE: 70 BPM | DIASTOLIC BLOOD PRESSURE: 80 MMHG | BODY MASS INDEX: 34.9 KG/M2 | SYSTOLIC BLOOD PRESSURE: 100 MMHG

## 2024-06-19 DIAGNOSIS — I87.2 CHRONIC VENOUS INSUFFICIENCY OF LOWER EXTREMITY: ICD-10-CM

## 2024-06-19 DIAGNOSIS — Z95.828 H/O ENDOVASCULAR STENT GRAFT FOR ABDOMINAL AORTIC ANEURYSM: ICD-10-CM

## 2024-06-19 DIAGNOSIS — Z98.61 CAD S/P PERCUTANEOUS CORONARY ANGIOPLASTY: ICD-10-CM

## 2024-06-19 DIAGNOSIS — Z85.46 HISTORY OF PROSTATE CANCER: ICD-10-CM

## 2024-06-19 DIAGNOSIS — I10 ESSENTIAL HYPERTENSION: ICD-10-CM

## 2024-06-19 DIAGNOSIS — J44.9 CHRONIC OBSTRUCTIVE PULMONARY DISEASE, UNSPECIFIED COPD TYPE (MULTI): ICD-10-CM

## 2024-06-19 DIAGNOSIS — E78.2 MIXED HYPERLIPIDEMIA: ICD-10-CM

## 2024-06-19 DIAGNOSIS — Z95.0 PACEMAKER: ICD-10-CM

## 2024-06-19 DIAGNOSIS — I25.10 CAD S/P PERCUTANEOUS CORONARY ANGIOPLASTY: ICD-10-CM

## 2024-06-19 DIAGNOSIS — I25.2 HISTORY OF MI (MYOCARDIAL INFARCTION): ICD-10-CM

## 2024-06-19 DIAGNOSIS — F17.200 CURRENT EVERY DAY SMOKER: ICD-10-CM

## 2024-06-19 DIAGNOSIS — E66.09 CLASS 2 OBESITY DUE TO EXCESS CALORIES WITHOUT SERIOUS COMORBIDITY WITH BODY MASS INDEX (BMI) OF 35.0 TO 35.9 IN ADULT: ICD-10-CM

## 2024-06-19 PROCEDURE — 3008F BODY MASS INDEX DOCD: CPT | Performed by: INTERNAL MEDICINE

## 2024-06-19 PROCEDURE — 1159F MED LIST DOCD IN RCRD: CPT | Performed by: INTERNAL MEDICINE

## 2024-06-19 PROCEDURE — 3079F DIAST BP 80-89 MM HG: CPT | Performed by: INTERNAL MEDICINE

## 2024-06-19 PROCEDURE — 3074F SYST BP LT 130 MM HG: CPT | Performed by: INTERNAL MEDICINE

## 2024-06-19 PROCEDURE — 99214 OFFICE O/P EST MOD 30 MIN: CPT | Performed by: INTERNAL MEDICINE

## 2024-06-19 NOTE — PROGRESS NOTES
CARDIOLOGY OFFICE VISIT      CHIEF COMPLAINT      HISTORY OF PRESENT ILLNESS  The patient states that he seems to be doing well from a cardiac standpoint.  He denies chest discomfort or symptoms of myocardial ischemia.  He has not taken any nitroglycerin.  He has his usual dyspnea secondary to COPD.  He denies palpitations syncope.  He is having some chronic low back pain and what sounds like some neuropathy in his right leg secondary to this.  I did go over his ultrasound of his abdominal aorta which demonstrates satisfactory appearance of his endovascular stent graft.  There is no evidence of endoleak.  He denies any problem with his current medications.  Impression:  Permanent pacemaker  Coronary artery disease, new onset angina, progressive and disabling, recommend cardiac catheterization and possible PCI, please see above  Non-STEMI May 2020  PCI with CLARI of circumflex coronary 5/29/2020  Endovascular stent graft repair of infrarenal abdominal aneurysm and right common iliac artery aneurysm on 8/21/2020  Hypertension  Hyperlipidemia  Tobacco abuse  COPD  Chronic venous insufficiency of lower extremities bilaterally  Family history of CAD  Obesity  History of prostate cancer, status post radiation therapy           Please excuse any errors in grammar or translation related to this dictation. Voice recognition software was utilized to prepare this document.          Past Medical History  Past Medical History:   Diagnosis Date    Encounter for general adult medical examination without abnormal findings     Encounter for wellness examination    Encounter for general adult medical examination without abnormal findings 08/08/2022    Encounter for wellness examination    Encounter for immunization     Encounter for immunization    Encounter for routine child health examination without abnormal findings     Well child examination    Encounter for screening for malignant neoplasm of colon     Screening for colon cancer     Encounter for screening for malignant neoplasm of colon     Colon cancer screening    Encounter for screening for malignant neoplasm of prostate     Screening PSA (prostate specific antigen)    Hyperventilation 05/13/2022    Labored breathing    Obesity, unspecified 10/12/2022    Class 1 obesity with body mass index (BMI) of 34.0 to 34.9 in adult    Pain in right hip 04/04/2022    Hip pain, bilateral    Pain in right knee 03/16/2022    Pain in both knees    Patient's noncompliance with other medical treatment and regimen due to unspecified reason     Noncompliance with treatment    Patient's noncompliance with other medical treatment and regimen due to unspecified reason 05/16/2022    Noncompliance by declining service    Personal history of malignant neoplasm of prostate     History of malignant neoplasm of prostate    Personal history of malignant neoplasm of prostate 03/16/2022    History of prostate cancer    Personal history of other diseases of the circulatory system     History of second degree heart block    Personal history of other diseases of the circulatory system 04/11/2022    History of uncontrolled hypertension    Personal history of other endocrine, nutritional and metabolic disease 03/16/2022    History of obesity    Personal history of other specified conditions 06/03/2022    History of syncope       Social History  Social History     Tobacco Use    Smoking status: Every Day     Current packs/day: 1.00     Average packs/day: 1 pack/day for 54.5 years (54.5 ttl pk-yrs)     Types: Cigarettes     Start date: 1970    Smokeless tobacco: Never   Substance Use Topics    Alcohol use: Yes     Comment: socially 1-2x week    Drug use: Never       Family History     Family History   Problem Relation Name Age of Onset    Coronary artery disease Mother      Other (Cardiac Pacemaker) Mother      Coronary artery disease Father      Other (pacemaker) Father      Coronary artery disease Sister           Allergies:  No Known Allergies     Outpatient Medications:  Current Outpatient Medications   Medication Instructions    acetaminophen (Tylenol 8 HOUR) 650 mg ER tablet TAKE AS NEEDED AS DIRECTED    albuterol 90 mcg/actuation inhaler 2 puffs, inhalation, Every 4 hours PRN    amLODIPine (NORVASC) 5 mg, oral, Nightly, (Increase in dose)    aspirin 81 mg, oral, Daily    atorvastatin (LIPITOR) 40 mg, oral, Nightly    budesonide-formoteroL (Symbicort) 160-4.5 mcg/actuation inhaler Symbicort 160 mcg-4.5 mcg/inh inhalation aerosol   Quantity: 0   Refills: 0   Ordered: 24-Jan-2023  Mary Chang Generic Substitution Allowed    carvedilol (COREG) 25 mg, oral, 2 times daily (morning and late afternoon)    cetirizine (ZyrTEC) 10 mg tablet 1 tablet, oral, Daily PRN    cholecalciferol (Vitamin D-3) 50 mcg (2,000 unit) capsule oral, Daily    hydroCHLOROthiazide (HYDRODIURIL) 25 mg, oral, Daily    ibuprofen 200 mg tablet 1-4 tablets, oral, 3 times daily PRN    losartan (COZAAR) 50 mg, oral, 2 times daily    sildenafil (VIAGRA) 50 mg, oral, Daily PRN    tamsulosin (FLOMAX) 0.4 mg, oral, Daily          REVIEW OF SYSTEMS  Review of Systems   All other systems reviewed and are negative.        VITALS  Vitals:    06/19/24 1029   BP: 100/80   Pulse: 70       PHYSICAL EXAM  Constitutional:       Appearance: Healthy appearance. Not in distress.   Eyes:      Conjunctiva/sclera: Conjunctivae normal.      Pupils: Pupils are equal, round, and reactive to light.   Neck:      Vascular: No JVR. JVD normal.   Pulmonary:      Effort: Pulmonary effort is normal.      Breath sounds: Normal breath sounds. No wheezing. No rhonchi. No rales.   Chest:      Chest wall: Not tender to palpatation.   Cardiovascular:      PMI at left midclavicular line. Normal rate. Regular rhythm. Normal S1. Normal S2.       Murmurs: There is no murmur.      No gallop.  No click. No rub.   Pulses:     Intact distal pulses.   Edema:     Peripheral edema absent.   Abdominal:       Tenderness: There is no abdominal tenderness.   Musculoskeletal: Normal range of motion.         General: No tenderness.      Cervical back: Normal range of motion. Skin:     General: Skin is warm and dry.   Neurological:      General: No focal deficit present.      Mental Status: Alert and oriented to person, place and time.           ASSESSMENT AND PLAN  Diagnoses and all orders for this visit:  Pacemaker  CAD S/P percutaneous coronary angioplasty  History of MI (myocardial infarction)  H/O endovascular stent graft for abdominal aortic aneurysm  Essential hypertension  Mixed hyperlipidemia  Chronic obstructive pulmonary disease, unspecified COPD type (Multi)  Chronic venous insufficiency of lower extremity  History of prostate cancer  Class 2 obesity due to excess calories without serious comorbidity with body mass index (BMI) of 35.0 to 35.9 in adult  Current every day smoker      [unfilled]

## 2024-06-19 NOTE — PATIENT INSTRUCTIONS
Follow up office visit in 1 year.    Continue same medications/treatment.  Patient educated on proper medication use.  Patient educated on risk factor modification.  Please bring any lab results from other providers / physicians to your next appointment.    Please bring all medicines, vitamins and herbal supplements with you when you come to the office.    Prescriptions will not be filled unless you are compliant with your follow up appointments or have a follow up  appointment scheduled as per instruction of your physician.  Refills should be requested at the time of  Your visit.    Constance NICE LPN, am scribing for and in the presence of Dr. Agustin Murphy MD, FACC

## 2024-06-27 ENCOUNTER — HOSPITAL ENCOUNTER (OUTPATIENT)
Dept: CARDIOLOGY | Facility: HOSPITAL | Age: 69
Discharge: HOME | End: 2024-06-27
Payer: COMMERCIAL

## 2024-06-27 DIAGNOSIS — Z95.0 CARDIAC PACEMAKER IN SITU: Primary | ICD-10-CM

## 2024-06-27 DIAGNOSIS — Z95.0 CARDIAC PACEMAKER IN SITU: ICD-10-CM

## 2024-06-27 DIAGNOSIS — I44.2 ATRIOVENTRICULAR BLOCK, COMPLETE (MULTI): ICD-10-CM

## 2024-06-27 PROCEDURE — 93294 REM INTERROG EVL PM/LDLS PM: CPT | Performed by: INTERNAL MEDICINE

## 2024-06-27 PROCEDURE — 93296 REM INTERROG EVL PM/IDS: CPT

## 2024-07-12 DIAGNOSIS — N40.0 BENIGN PROSTATIC HYPERPLASIA WITHOUT LOWER URINARY TRACT SYMPTOMS: ICD-10-CM

## 2024-07-12 RX ORDER — TAMSULOSIN HYDROCHLORIDE 0.4 MG/1
0.4 CAPSULE ORAL DAILY
Qty: 90 CAPSULE | Refills: 1 | Status: SHIPPED | OUTPATIENT
Start: 2024-07-12

## 2024-08-01 ENCOUNTER — APPOINTMENT (OUTPATIENT)
Dept: RADIOLOGY | Facility: CLINIC | Age: 69
End: 2024-08-01
Payer: COMMERCIAL

## 2024-08-26 ENCOUNTER — APPOINTMENT (OUTPATIENT)
Dept: PRIMARY CARE | Facility: CLINIC | Age: 69
End: 2024-08-26
Payer: COMMERCIAL

## 2024-08-26 VITALS
OXYGEN SATURATION: 91 % | SYSTOLIC BLOOD PRESSURE: 112 MMHG | HEART RATE: 63 BPM | BODY MASS INDEX: 35.33 KG/M2 | HEIGHT: 70 IN | TEMPERATURE: 97.8 F | WEIGHT: 246.8 LBS | DIASTOLIC BLOOD PRESSURE: 70 MMHG

## 2024-08-26 DIAGNOSIS — Z95.828 H/O ENDOVASCULAR STENT GRAFT FOR ABDOMINAL AORTIC ANEURYSM: ICD-10-CM

## 2024-08-26 DIAGNOSIS — Z00.00 ROUTINE GENERAL MEDICAL EXAMINATION AT HEALTH CARE FACILITY: Primary | ICD-10-CM

## 2024-08-26 DIAGNOSIS — J44.9 CHRONIC OBSTRUCTIVE PULMONARY DISEASE, UNSPECIFIED COPD TYPE (MULTI): ICD-10-CM

## 2024-08-26 DIAGNOSIS — Z85.46 HISTORY OF PROSTATE CANCER: ICD-10-CM

## 2024-08-26 DIAGNOSIS — I10 ESSENTIAL HYPERTENSION: ICD-10-CM

## 2024-08-26 DIAGNOSIS — F17.200 CURRENT EVERY DAY SMOKER: ICD-10-CM

## 2024-08-26 DIAGNOSIS — Z98.61 CAD S/P PERCUTANEOUS CORONARY ANGIOPLASTY: ICD-10-CM

## 2024-08-26 DIAGNOSIS — E78.2 MIXED HYPERLIPIDEMIA: ICD-10-CM

## 2024-08-26 DIAGNOSIS — I25.10 CAD S/P PERCUTANEOUS CORONARY ANGIOPLASTY: ICD-10-CM

## 2024-08-26 DIAGNOSIS — I44.2 COMPLETE HEART BLOCK (MULTI): ICD-10-CM

## 2024-08-26 PROCEDURE — G0444 DEPRESSION SCREEN ANNUAL: HCPCS | Performed by: FAMILY MEDICINE

## 2024-08-26 PROCEDURE — 1159F MED LIST DOCD IN RCRD: CPT | Performed by: FAMILY MEDICINE

## 2024-08-26 PROCEDURE — 3008F BODY MASS INDEX DOCD: CPT | Performed by: FAMILY MEDICINE

## 2024-08-26 PROCEDURE — 99397 PER PM REEVAL EST PAT 65+ YR: CPT | Performed by: FAMILY MEDICINE

## 2024-08-26 PROCEDURE — G0439 PPPS, SUBSEQ VISIT: HCPCS | Performed by: FAMILY MEDICINE

## 2024-08-26 PROCEDURE — 3074F SYST BP LT 130 MM HG: CPT | Performed by: FAMILY MEDICINE

## 2024-08-26 PROCEDURE — 99214 OFFICE O/P EST MOD 30 MIN: CPT | Performed by: FAMILY MEDICINE

## 2024-08-26 PROCEDURE — 99497 ADVNCD CARE PLAN 30 MIN: CPT | Performed by: FAMILY MEDICINE

## 2024-08-26 PROCEDURE — 3078F DIAST BP <80 MM HG: CPT | Performed by: FAMILY MEDICINE

## 2024-08-26 PROCEDURE — 1170F FXNL STATUS ASSESSED: CPT | Performed by: FAMILY MEDICINE

## 2024-08-26 RX ORDER — AMOXICILLIN 500 MG/1
500 TABLET, FILM COATED ORAL 3 TIMES DAILY
COMMUNITY

## 2024-08-26 RX ORDER — TIOTROPIUM BROMIDE 18 UG/1
1 CAPSULE ORAL; RESPIRATORY (INHALATION)
COMMUNITY

## 2024-08-26 RX ORDER — PREDNISONE 10 MG/1
10 TABLET ORAL DAILY
COMMUNITY

## 2024-08-26 RX ORDER — BUDESONIDE, GLYCOPYRROLATE, AND FORMOTEROL FUMARATE 160; 9; 4.8 UG/1; UG/1; UG/1
2 AEROSOL, METERED RESPIRATORY (INHALATION)
COMMUNITY

## 2024-08-26 ASSESSMENT — PATIENT HEALTH QUESTIONNAIRE - PHQ9
2. FEELING DOWN, DEPRESSED OR HOPELESS: SEVERAL DAYS
SUM OF ALL RESPONSES TO PHQ9 QUESTIONS 1 AND 2: 2
10. IF YOU CHECKED OFF ANY PROBLEMS, HOW DIFFICULT HAVE THESE PROBLEMS MADE IT FOR YOU TO DO YOUR WORK, TAKE CARE OF THINGS AT HOME, OR GET ALONG WITH OTHER PEOPLE: SOMEWHAT DIFFICULT
1. LITTLE INTEREST OR PLEASURE IN DOING THINGS: SEVERAL DAYS

## 2024-08-26 ASSESSMENT — ENCOUNTER SYMPTOMS
DEPRESSION: 1
LOSS OF SENSATION IN FEET: 1
OCCASIONAL FEELINGS OF UNSTEADINESS: 0

## 2024-08-26 ASSESSMENT — ACTIVITIES OF DAILY LIVING (ADL)
TAKING_MEDICATION: INDEPENDENT
BATHING: INDEPENDENT
GROCERY_SHOPPING: INDEPENDENT
DOING_HOUSEWORK: INDEPENDENT
DRESSING: INDEPENDENT
MANAGING_FINANCES: NEEDS ASSISTANCE

## 2024-08-26 NOTE — PROGRESS NOTES
Subjective   Reason for Visit: Bladimir Brunner Jr. is an 68 y.o. male here for a Medicare Wellness visit.      Medicare wellness documentation reviewed in detail  Declines colon ca screen  Ho prostate ca treated radiation  Due for fu psa  Vaccs discussed  Declines shingles  Declines flu shot  Labs ordered    Has had a history of prostate cancer.  Treated.  5 years ago.  Radiation.  Does not follow with urology.  I encouraged a PSA.  Declines colon cancer screening.  Declines further vaccinations as noted.  Laboratory assessments been ordered.  Lifestyle modifications reinforced physical activity as tolerated healthy diet medication compliance encouraged.  Depression Screening  Depression screening completed using the PHQ-2 questions, with results documented in the chart/encounter (~5min).  (See Rooming Screening section for documentation, and/or progress note for additional information)    Cardiac Risk Assessment  The ASCVD Risk score (Paul SEWELL, et al., 2019) failed to calculate for the following reasons:    The patient has a prior MI or stroke diagnosis  Further cv risk discussed with patient     Cardiovascular risk was discussed and, if needed, lifestyle modifications recommended, including nutritional choices, exercise, and elimination of habits contributing to risk. We agreed on a plan to reduce the current cardiovascular risk based on above discussion as needed.     Aspirin use/disuse was discussed and documented in the Problem List of the medical record (under Cardiac Risk Counseling) after reviewing the updated guidelines below:  Consider low dose Aspirin ( mg) use if the benefit for cardiovascular disease prevention outweighs risk for bleeding complications.   Discussed that in general, low dose ASA should be considered:  In patients WITHOUT prior MI/stroke/PAD (primary prevention):   a. Age <60: Use if 10-year cardiovascular disease risk >20%, with discussion of risks and benefits with  patient  b. Age 60-<70: Use if 10-year cardiovascular disease risk >20% and low bleeding (e.g., gastrointestinal) risk, with discussion of risks and benefits with patient  c. Age >=70: Do not use    In patients WITH prior MI/stroke/PAD (secondary prevention):   Generally use unless extremely high bleeding (e.g., gastrointenstinal) risk, with discussion of risks and benefits with patient    Advance Directives Discussion  Advanced Care Planning (including a Living Will, Healthcare POA, as well as specific end of life choices and/or directives), was discussed with the patient and/or surrogate, voluntarily, and details of that discussion documented in the Problem List (under Advanced Directives Discussion) of the medical record.    (~16 min spent discussing above)     During the course of the visit the patient was educated and counseled about age appropriate screening and preventive services.   Completed preventive screenings were documented in the chart (see Routine Health Maintenance in Problem List) and orders were placed for outstanding screenings/procedures as documented in the Assessment and Plan.    Patient Instructions (the written plan) was given to the patient at check out.     Reviewed all medications by prescribing practitioner or clinical pharmacist (such as prescriptions, OTCs, herbal therapies and supplements) and documented in the medical record.    HPI    Patient Care Team:  Edu Carey MD as PCP - General (Family Medicine)  Faith Martinez MD as PCP - [facility]ributed [facility]  Agustin Murphy MD as Cardiologist (Cardiology)  Miah Swan MD as Cardiologist (Cardiology)     Review of Systems      General Medical follow-up.  History of coronary disease.  Pacemaker.  No recent anginal chest pain.  No dyspnea on exertion above baseline.  History of COPD.  Follows with pulmonology.  Recent medications are adjusted.  He was on antibiotics and steroids.  He was evaluated for  "home oxygen.  Otherwise he has had chronic arthritis aches and pains rotator cuff problems.  At the [facility] he started a treatment for some nerve and energy conduction systems with temporary tattoos.  He states that they have done remarkable results and improvements in his functional capacity reduced his pain increased his mood and motivation and physical capacity.  His medications are reconciled.  For now continue current medical therapy reinforced also modifications physical activity as tolerated healthy diet medication compliance encouraged regular labs as ordered.    Objective   Vitals:  /70 (BP Location: Left arm, Patient Position: Sitting)   Pulse 63   Temp 36.6 °C (97.8 °F)   Ht 1.765 m (5' 9.5\")   Wt 112 kg (246 lb 12.8 oz)   SpO2 91% Comment: RA  BMI 35.92 kg/m²       Physical Exam  Vitals and nursing note reviewed.   Constitutional:       General: He is not in acute distress.     Appearance: He is not ill-appearing or toxic-appearing.   HENT:      Head: Normocephalic and atraumatic.      Mouth/Throat:      Pharynx: No oropharyngeal exudate or posterior oropharyngeal erythema.   Eyes:      Extraocular Movements: Extraocular movements intact.      Conjunctiva/sclera: Conjunctivae normal.      Pupils: Pupils are equal, round, and reactive to light.   Cardiovascular:      Rate and Rhythm: Normal rate and regular rhythm.      Pulses: Normal pulses.      Heart sounds: Normal heart sounds. No murmur heard.  Pulmonary:      Effort: Pulmonary effort is normal.      Breath sounds: Normal breath sounds. No wheezing, rhonchi or rales.   Abdominal:      General: Abdomen is flat. Bowel sounds are normal. There is no distension.      Palpations: Abdomen is soft. There is no mass.      Tenderness: There is no abdominal tenderness.      Hernia: No hernia is present.   Musculoskeletal:         General: No swelling or deformity. Normal range of motion.      Cervical back: Normal range of motion and " neck supple.   Skin:     General: Skin is warm and dry.      Capillary Refill: Capillary refill takes less than 2 seconds.      Coloration: Skin is not jaundiced.      Findings: No erythema or rash.   Neurological:      General: No focal deficit present.      Mental Status: He is oriented to person, place, and time. Mental status is at baseline.   Psychiatric:         Mood and Affect: Mood normal.         Behavior: Behavior normal.         Thought Content: Thought content normal.         Judgment: Judgment normal.         Assessment/Plan   Problem List Items Addressed This Visit             ICD-10-CM    CAD S/P percutaneous coronary angioplasty I25.10, Z98.61    Relevant Orders    Comprehensive metabolic panel    Lipid panel    Tsh With Reflex To Free T4 If Abnormal    CBC    Chronic obstructive pulmonary disease (Multi) J44.9    Complete heart block (Multi) I44.2    Current every day smoker F17.200    Essential hypertension I10    Relevant Orders    Comprehensive metabolic panel    H/O endovascular stent graft for abdominal aortic aneurysm Z95.828    History of prostate cancer Z85.46    Relevant Orders    PSA    Mixed hyperlipidemia E78.2    Relevant Orders    Comprehensive metabolic panel    Lipid panel     Other Visit Diagnoses         Codes    Routine general medical examination at health care facility    -  Primary Z00.00

## 2024-09-13 DIAGNOSIS — E55.9 VITAMIN D DEFICIENCY, UNSPECIFIED: ICD-10-CM

## 2024-09-13 DIAGNOSIS — I25.10 ATHEROSCLEROTIC HEART DISEASE OF NATIVE CORONARY ARTERY WITHOUT ANGINA PECTORIS: ICD-10-CM

## 2024-09-13 RX ORDER — ACETAMINOPHEN 500 MG
TABLET ORAL DAILY
Qty: 90 CAPSULE | Refills: 1 | Status: SHIPPED | OUTPATIENT
Start: 2024-09-13

## 2024-09-13 RX ORDER — CARVEDILOL 25 MG/1
25 TABLET ORAL 2 TIMES DAILY
Qty: 180 TABLET | Refills: 3 | Status: SHIPPED | OUTPATIENT
Start: 2024-09-13

## 2024-09-30 DIAGNOSIS — E78.2 MIXED HYPERLIPIDEMIA: ICD-10-CM

## 2024-09-30 DIAGNOSIS — I10 ESSENTIAL (PRIMARY) HYPERTENSION: ICD-10-CM

## 2024-09-30 RX ORDER — ATORVASTATIN CALCIUM 40 MG/1
40 TABLET, FILM COATED ORAL NIGHTLY
Qty: 90 TABLET | Refills: 3 | Status: SHIPPED | OUTPATIENT
Start: 2024-09-30

## 2024-09-30 RX ORDER — LOSARTAN POTASSIUM 50 MG/1
50 TABLET ORAL 2 TIMES DAILY
Qty: 180 TABLET | Refills: 1 | Status: SHIPPED | OUTPATIENT
Start: 2024-09-30

## 2024-09-30 NOTE — TELEPHONE ENCOUNTER
Received request for prescription refills for patient.   Patient follows with Dr. Murphy    Request is for Atorvastatin 40mg  Is patient currently on medication Y     Last OV 6/19/24  Next OV 6/27/25    Pended for signing and sent to provider

## 2024-10-01 ENCOUNTER — HOSPITAL ENCOUNTER (OUTPATIENT)
Dept: CARDIOLOGY | Facility: HOSPITAL | Age: 69
Discharge: HOME | End: 2024-10-01
Payer: COMMERCIAL

## 2024-10-01 DIAGNOSIS — I44.2 ATRIOVENTRICULAR BLOCK, COMPLETE (MULTI): ICD-10-CM

## 2024-10-01 DIAGNOSIS — Z95.0 CARDIAC PACEMAKER IN SITU: ICD-10-CM

## 2024-10-01 PROCEDURE — 93296 REM INTERROG EVL PM/IDS: CPT

## 2024-10-01 PROCEDURE — 93294 REM INTERROG EVL PM/LDLS PM: CPT | Performed by: INTERNAL MEDICINE

## 2024-10-16 DIAGNOSIS — I10 ESSENTIAL HYPERTENSION: ICD-10-CM

## 2024-10-16 RX ORDER — AMLODIPINE BESYLATE 5 MG/1
5 TABLET ORAL NIGHTLY
Qty: 90 TABLET | Refills: 3 | Status: SHIPPED | OUTPATIENT
Start: 2024-10-16 | End: 2025-10-16

## 2024-10-16 NOTE — TELEPHONE ENCOUNTER
Received request for prescription refills for patient.   Patient follows with Dr. Swan     Request is for Amlodipine 5 mg   Is patient currently on medication yes     Last OV 12/13/23  Next OV 12/11/24    Pended for signing and sent to provider

## 2024-11-07 DIAGNOSIS — I10 ESSENTIAL HYPERTENSION: ICD-10-CM

## 2024-11-07 RX ORDER — HYDROCHLOROTHIAZIDE 25 MG/1
25 TABLET ORAL DAILY
Qty: 90 TABLET | Refills: 3 | Status: SHIPPED | OUTPATIENT
Start: 2024-11-07

## 2024-11-07 NOTE — TELEPHONE ENCOUNTER
Received request for prescription refills for patient.   Patient follows with Dr. Murphy and Dr. Swan     Request is for hydrochlorothiazide 25mg QD  Is patient currently on medication yes    Last OV 6/19/24  Next OV 12/11/24    Pended for signing and sent to provider

## 2024-11-13 ENCOUNTER — APPOINTMENT (OUTPATIENT)
Dept: CARDIOLOGY | Facility: CLINIC | Age: 69
End: 2024-11-13
Payer: COMMERCIAL

## 2024-12-11 ENCOUNTER — HOSPITAL ENCOUNTER (OUTPATIENT)
Dept: CARDIOLOGY | Facility: HOSPITAL | Age: 69
Discharge: HOME | End: 2024-12-11
Payer: COMMERCIAL

## 2024-12-11 ENCOUNTER — APPOINTMENT (OUTPATIENT)
Dept: CARDIOLOGY | Facility: CLINIC | Age: 69
End: 2024-12-11
Payer: COMMERCIAL

## 2024-12-11 VITALS
HEART RATE: 74 BPM | HEIGHT: 70 IN | WEIGHT: 249 LBS | SYSTOLIC BLOOD PRESSURE: 118 MMHG | BODY MASS INDEX: 35.65 KG/M2 | DIASTOLIC BLOOD PRESSURE: 84 MMHG

## 2024-12-11 DIAGNOSIS — F17.200 CURRENT EVERY DAY SMOKER: ICD-10-CM

## 2024-12-11 DIAGNOSIS — I44.2 ATRIOVENTRICULAR BLOCK, COMPLETE (MULTI): ICD-10-CM

## 2024-12-11 DIAGNOSIS — Z95.0 PACEMAKER: ICD-10-CM

## 2024-12-11 DIAGNOSIS — Z95.0 CARDIAC PACEMAKER IN SITU: ICD-10-CM

## 2024-12-11 DIAGNOSIS — Z95.0 PACEMAKER: Primary | ICD-10-CM

## 2024-12-11 PROCEDURE — 93280 PM DEVICE PROGR EVAL DUAL: CPT | Performed by: INTERNAL MEDICINE

## 2024-12-11 PROCEDURE — 3008F BODY MASS INDEX DOCD: CPT | Performed by: INTERNAL MEDICINE

## 2024-12-11 PROCEDURE — 1159F MED LIST DOCD IN RCRD: CPT | Performed by: INTERNAL MEDICINE

## 2024-12-11 PROCEDURE — 99214 OFFICE O/P EST MOD 30 MIN: CPT | Performed by: INTERNAL MEDICINE

## 2024-12-11 PROCEDURE — 3074F SYST BP LT 130 MM HG: CPT | Performed by: INTERNAL MEDICINE

## 2024-12-11 PROCEDURE — 93000 ELECTROCARDIOGRAM COMPLETE: CPT | Mod: DISTINCT PROCEDURAL SERVICE | Performed by: INTERNAL MEDICINE

## 2024-12-11 PROCEDURE — 3079F DIAST BP 80-89 MM HG: CPT | Performed by: INTERNAL MEDICINE

## 2024-12-11 PROCEDURE — 93280 PM DEVICE PROGR EVAL DUAL: CPT

## 2024-12-11 ASSESSMENT — ENCOUNTER SYMPTOMS
DYSPNEA ON EXERTION: 0
PALPITATIONS: 0

## 2024-12-11 NOTE — PATIENT INSTRUCTIONS
Continue same medications/treatment.  Patient educated on proper medication use.  Patient educated on risk factor modification.  Please bring any lab results from other providers/physicians to your next appointment.    Please bring all medicines, vitamins, and herbal supplements with you when you come to the office.    Prescriptions will not be filled unless you are compliant with your follow up appointments or have a follow up appointment scheduled as per instruction of your physician. Refills should be requested at the time of your visit.    Follow up with Dr. Linton in 1 year with device check  Continue remote checks at 3, 6, and 9 months    Luz Marina NICE RN, AM SCRIBING FOR, AND IN THE PRESENCE OF DR. GHADA LINTON MD

## 2024-12-11 NOTE — PROGRESS NOTES
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 He underwent endovascular stent graft repair of the infra renal abdominal aortic aneurysm and right common iliac aneurysm on August 21, 2020.    Since the last office visit he has been doing well.  He denies any symptoms of chest pain or shortness breath or palpitations.    EKG performed today shows atrial ventricular paced rhythm at rate of 74 bpm, QRS duration 172 ms.  QT corrected 497 ms.  Rhythm strip shows the same pattern.    Patient had a device interrogation today at the device clinic and it shows dual-chamber pacemaker [employer] with battery longevity 8 years.  No evidence of atrial fibrillation or ventricular arrhythmias.          Past Medical History  Past Medical History:   Diagnosis Date    Encounter for general adult medical examination without abnormal findings     Encounter for wellness examination    Encounter for general adult medical examination without abnormal findings 08/08/2022    Encounter for wellness examination    Encounter for immunization     Encounter for immunization    Encounter for routine child health examination without abnormal findings     Well child examination    Encounter for screening for malignant neoplasm of colon     Screening for colon cancer    Encounter for screening for malignant neoplasm of colon     Colon cancer screening    Encounter for screening for malignant neoplasm of prostate     Screening PSA  (prostate specific antigen)    Hyperventilation 05/13/2022    Labored breathing    Obesity, unspecified 10/12/2022    Class 1 obesity with body mass index (BMI) of 34.0 to 34.9 in adult    Pain in right hip 04/04/2022    Hip pain, bilateral    Pain in right knee 03/16/2022    Pain in both knees    Patient's noncompliance with other medical treatment and regimen due to unspecified reason     Noncompliance with treatment    Patient's noncompliance with other medical treatment and regimen due to unspecified reason 05/16/2022    Noncompliance by declining service    Personal history of malignant neoplasm of prostate     History of malignant neoplasm of prostate    Personal history of malignant neoplasm of prostate 03/16/2022    History of prostate cancer    Personal history of other diseases of the circulatory system     History of second degree heart block    Personal history of other diseases of the circulatory system 04/11/2022    History of uncontrolled hypertension    Personal history of other endocrine, nutritional and metabolic disease 03/16/2022    History of obesity    Personal history of other specified conditions 06/03/2022    History of syncope       Social History  Social History     Tobacco Use    Smoking status: Every Day     Current packs/day: 1.00     Average packs/day: 1 pack/day for 54.9 years (54.9 ttl pk-yrs)     Types: Cigarettes     Start date: 1970     Passive exposure: Current    Smokeless tobacco: Never   Vaping Use    Vaping status: Never Used   Substance Use Topics    Alcohol use: Yes     Comment: socially 1-2x week    Drug use: Never       Family History     Family History   Problem Relation Name Age of Onset    Coronary artery disease Mother      Other (Cardiac Pacemaker) Mother      Coronary artery disease Father      Other (pacemaker) Father      Coronary artery disease Sister          Allergies:  No Known Allergies     Outpatient Medications:  Current Outpatient Medications   Medication  Instructions    acetaminophen (Tylenol 8 HOUR) 650 mg ER tablet TAKE AS NEEDED AS DIRECTED    albuterol 90 mcg/actuation inhaler 2 puffs, inhalation, Every 4 hours PRN    amLODIPine (NORVASC) 5 mg, oral, Nightly, (Increase in dose)    amoxicillin (AMOXIL) 500 mg, 3 times daily    aspirin 81 mg, Daily    atorvastatin (LIPITOR) 40 mg, oral, Nightly    budesonide-formoteroL (Symbicort) 160-4.5 mcg/actuation inhaler Symbicort 160 mcg-4.5 mcg/inh inhalation aerosol   Quantity: 0   Refills: 0   Ordered: 24-Jan-2023  Mary Chang Generic Substitution Allowed    budesonide-glycopyr-formoterol (Breztri Aerosphere) 160-9-4.8 mcg/actuation HFA aerosol inhaler 2 puffs, 2 times daily RT    carvedilol (COREG) 25 mg, oral, 2 times daily    cetirizine (ZyrTEC) 10 mg tablet 1 tablet, Daily PRN    cholecalciferol (Vitamin D-3) 50 mcg (2,000 unit) capsule oral, Daily    hydroCHLOROthiazide (HYDRODIURIL) 25 mg, oral, Daily    ibuprofen 200 mg tablet 1-4 tablets, 3 times daily PRN    losartan (COZAAR) 50 mg, oral, 2 times daily    predniSONE (DELTASONE) 10 mg, Daily    sildenafil (VIAGRA) 50 mg, oral, Daily PRN    tamsulosin (FLOMAX) 0.4 mg, oral, Daily    tiotropium (Spiriva) 18 mcg inhalation capsule 1 capsule, Daily RT          REVIEW OF SYSTEMS  Review of Systems   Cardiovascular:  Negative for chest pain, dyspnea on exertion and palpitations.   All other systems reviewed and are negative.        VITALS  Vitals:    12/11/24 1052   BP: 118/84   Pulse: 74       PHYSICAL EXAM  Constitutional:       General: Awake.      Appearance: Normal and healthy appearance. Well-developed and not in distress. Obese.   Neck:      Vascular: No JVR. JVD normal.   Pulmonary:      Effort: Pulmonary effort is normal.      Breath sounds: Normal breath sounds. No wheezing. No rhonchi. No rales.   Chest:      Chest wall: Not tender to palpatation.      Comments: Left sided device pocket- healed and well approximated. No swelling or hematoma       Cardiovascular:      PMI at left midclavicular line. Normal rate. Regular rhythm. Normal S1. Normal S2.       Murmurs: There is no murmur.      No gallop.  No click. No rub.   Pulses:     Intact distal pulses.   Edema:     Peripheral edema absent.   Abdominal:      Tenderness: There is no abdominal tenderness.   Musculoskeletal: Normal range of motion.         General: No tenderness. Skin:     General: Skin is warm and dry.   Neurological:      General: No focal deficit present.      Mental Status: Alert and oriented to person, place and time.           ASSESSMENT AND PLAN    Clinical impressions:  1. Second-degree AV block and transient complete heart block status post dual-chamber pacemaker implant ([employer] XT DR MRI) on December 8, 2020.  2. Coronary artery disease status post PTCA with drug-eluting stent to the mid circumflex coronary artery on May 29, 2020 with left heart catheterization dated April 7, 2023 revealing less than 10% left main, 10 to 30% mid LAD, patent circumflex stents, 10% mid circumflex, 50% mid RCA stenosis with recommendation for medical management.  3. Normal left ventricular function per 2D echocardiogram dated April 7, 2023 with an ejection fraction of 55% and left heart catheterization dated April 7, 2023 revealing an ejection fraction of 60%..  4. Endovascular stent graft repair of the infrarenal abdominal aortic aneurysm and right common iliac aneurysm on August 21, 2020.  5. Valvular heart disease consisting of trace to mild TR, mild mitral annular calcification with trace to mild MR per 2D echocardiogram dated April 7, 2023.  6. Hypertension controlled      7. Dyslipidemia on statin.  8. Chronic obstructive pulmonary disease with ongoing tobacco use.  9. History of prostate cancer status post radiation therapy.  10. Obesity with a BMI of 35.01.  11. Chronic venous insufficiency of the lower extremities bilaterally.  12. Syncope secondary to hypotension.  13. Scrotal edema  under evaluation.    Plan recommendations    Patient is doing well from the electrophysiologist on point.  He will be follow my office every year or sooner if needed.    Continue with follow-up with device clinic.    Continue with beta-blocker therapy.    Risk factor modification and lifestyle modification discussed with patient. Diet , exercise and hydration discussed with patient.    I have personally review with patient during this office visit, laboratory data, echocardiogram results, stress test results, Holter-event monitor results prior and after the last electrophysiology visit. All questions has been answered.    Please excuse any errors in grammar or translation related to this dictation.  Voice recognition software was utilized to prepare this document.

## 2024-12-30 DIAGNOSIS — N40.0 BENIGN PROSTATIC HYPERPLASIA WITHOUT LOWER URINARY TRACT SYMPTOMS: ICD-10-CM

## 2024-12-31 RX ORDER — TAMSULOSIN HYDROCHLORIDE 0.4 MG/1
0.4 CAPSULE ORAL DAILY
Qty: 90 CAPSULE | Refills: 1 | Status: SHIPPED | OUTPATIENT
Start: 2024-12-31

## 2025-02-12 LAB
ALBUMIN SERPL-MCNC: 4.2 G/DL (ref 3.6–5.1)
ALP SERPL-CCNC: 64 U/L (ref 35–144)
ALT SERPL-CCNC: 18 U/L (ref 9–46)
ANION GAP SERPL CALCULATED.4IONS-SCNC: 9 MMOL/L (CALC) (ref 7–17)
AST SERPL-CCNC: 13 U/L (ref 10–35)
BILIRUB SERPL-MCNC: 0.5 MG/DL (ref 0.2–1.2)
BUN SERPL-MCNC: 18 MG/DL (ref 7–25)
CALCIUM SERPL-MCNC: 9.4 MG/DL (ref 8.6–10.3)
CHLORIDE SERPL-SCNC: 96 MMOL/L (ref 98–110)
CHOLEST SERPL-MCNC: 135 MG/DL
CHOLEST/HDLC SERPL: 4 (CALC)
CO2 SERPL-SCNC: 36 MMOL/L (ref 20–32)
CREAT SERPL-MCNC: 1.05 MG/DL (ref 0.7–1.35)
EGFRCR SERPLBLD CKD-EPI 2021: 77 ML/MIN/1.73M2
ERYTHROCYTE [DISTWIDTH] IN BLOOD BY AUTOMATED COUNT: 12.5 % (ref 11–15)
GLUCOSE SERPL-MCNC: 130 MG/DL (ref 65–139)
HCT VFR BLD AUTO: 49.9 % (ref 38.5–50)
HDLC SERPL-MCNC: 34 MG/DL
HGB BLD-MCNC: 16.6 G/DL (ref 13.2–17.1)
LDLC SERPL CALC-MCNC: 77 MG/DL (CALC)
MCH RBC QN AUTO: 32 PG (ref 27–33)
MCHC RBC AUTO-ENTMCNC: 33.3 G/DL (ref 32–36)
MCV RBC AUTO: 96.1 FL (ref 80–100)
NONHDLC SERPL-MCNC: 101 MG/DL (CALC)
PLATELET # BLD AUTO: 232 THOUSAND/UL (ref 140–400)
PMV BLD REES-ECKER: 10.6 FL (ref 7.5–12.5)
POTASSIUM SERPL-SCNC: 3.9 MMOL/L (ref 3.5–5.3)
PROT SERPL-MCNC: 6.7 G/DL (ref 6.1–8.1)
PSA SERPL-MCNC: 0.68 NG/ML
RBC # BLD AUTO: 5.19 MILLION/UL (ref 4.2–5.8)
SODIUM SERPL-SCNC: 141 MMOL/L (ref 135–146)
TRIGL SERPL-MCNC: 144 MG/DL
TSH SERPL-ACNC: 2.65 MIU/L (ref 0.4–4.5)
WBC # BLD AUTO: 6.2 THOUSAND/UL (ref 3.8–10.8)

## 2025-02-23 DIAGNOSIS — E55.9 VITAMIN D DEFICIENCY, UNSPECIFIED: ICD-10-CM

## 2025-02-24 RX ORDER — ACETAMINOPHEN 500 MG
TABLET ORAL DAILY
Qty: 90 CAPSULE | Refills: 1 | Status: SHIPPED | OUTPATIENT
Start: 2025-02-24

## 2025-02-26 ENCOUNTER — APPOINTMENT (OUTPATIENT)
Dept: PRIMARY CARE | Facility: CLINIC | Age: 70
End: 2025-02-26
Payer: COMMERCIAL

## 2025-02-26 VITALS
TEMPERATURE: 97.9 F | SYSTOLIC BLOOD PRESSURE: 120 MMHG | BODY MASS INDEX: 36.25 KG/M2 | HEIGHT: 70 IN | OXYGEN SATURATION: 90 % | WEIGHT: 253.2 LBS | DIASTOLIC BLOOD PRESSURE: 80 MMHG | HEART RATE: 64 BPM

## 2025-02-26 DIAGNOSIS — N40.0 BENIGN PROSTATIC HYPERPLASIA WITHOUT LOWER URINARY TRACT SYMPTOMS: ICD-10-CM

## 2025-02-26 DIAGNOSIS — N40.1 BENIGN LOCALIZED PROSTATIC HYPERPLASIA WITH LOWER URINARY TRACT SYMPTOMS (LUTS): ICD-10-CM

## 2025-02-26 DIAGNOSIS — E78.2 MIXED HYPERLIPIDEMIA: ICD-10-CM

## 2025-02-26 DIAGNOSIS — N52.9 ERECTILE DYSFUNCTION, UNSPECIFIED ERECTILE DYSFUNCTION TYPE: ICD-10-CM

## 2025-02-26 DIAGNOSIS — I25.10 ATHEROSCLEROTIC HEART DISEASE OF NATIVE CORONARY ARTERY WITHOUT ANGINA PECTORIS: ICD-10-CM

## 2025-02-26 DIAGNOSIS — E66.812 CLASS 2 OBESITY DUE TO EXCESS CALORIES WITHOUT SERIOUS COMORBIDITY WITH BODY MASS INDEX (BMI) OF 35.0 TO 35.9 IN ADULT: ICD-10-CM

## 2025-02-26 DIAGNOSIS — E55.9 VITAMIN D DEFICIENCY: ICD-10-CM

## 2025-02-26 DIAGNOSIS — I25.10 CAD S/P PERCUTANEOUS CORONARY ANGIOPLASTY: ICD-10-CM

## 2025-02-26 DIAGNOSIS — Z98.61 CAD S/P PERCUTANEOUS CORONARY ANGIOPLASTY: ICD-10-CM

## 2025-02-26 DIAGNOSIS — F17.200 CURRENT EVERY DAY SMOKER: ICD-10-CM

## 2025-02-26 DIAGNOSIS — I10 ESSENTIAL HYPERTENSION: ICD-10-CM

## 2025-02-26 DIAGNOSIS — I10 ESSENTIAL (PRIMARY) HYPERTENSION: ICD-10-CM

## 2025-02-26 DIAGNOSIS — E66.09 CLASS 2 OBESITY DUE TO EXCESS CALORIES WITHOUT SERIOUS COMORBIDITY WITH BODY MASS INDEX (BMI) OF 35.0 TO 35.9 IN ADULT: ICD-10-CM

## 2025-02-26 DIAGNOSIS — J44.9 CHRONIC OBSTRUCTIVE PULMONARY DISEASE, UNSPECIFIED COPD TYPE (MULTI): Primary | ICD-10-CM

## 2025-02-26 PROCEDURE — 3079F DIAST BP 80-89 MM HG: CPT | Performed by: FAMILY MEDICINE

## 2025-02-26 PROCEDURE — 3074F SYST BP LT 130 MM HG: CPT | Performed by: FAMILY MEDICINE

## 2025-02-26 PROCEDURE — 3008F BODY MASS INDEX DOCD: CPT | Performed by: FAMILY MEDICINE

## 2025-02-26 PROCEDURE — 99214 OFFICE O/P EST MOD 30 MIN: CPT | Performed by: FAMILY MEDICINE

## 2025-02-26 PROCEDURE — 1124F ACP DISCUSS-NO DSCNMKR DOCD: CPT | Performed by: FAMILY MEDICINE

## 2025-02-26 PROCEDURE — 1159F MED LIST DOCD IN RCRD: CPT | Performed by: FAMILY MEDICINE

## 2025-02-26 RX ORDER — ATORVASTATIN CALCIUM 40 MG/1
40 TABLET, FILM COATED ORAL NIGHTLY
Qty: 90 TABLET | Refills: 3 | Status: SHIPPED | OUTPATIENT
Start: 2025-02-26

## 2025-02-26 RX ORDER — HYDROCHLOROTHIAZIDE 25 MG/1
25 TABLET ORAL DAILY
Qty: 90 TABLET | Refills: 3 | Status: SHIPPED | OUTPATIENT
Start: 2025-02-26

## 2025-02-26 RX ORDER — TADALAFIL 2.5 MG/1
2.5 TABLET ORAL DAILY
Qty: 90 TABLET | Refills: 1 | Status: SHIPPED | OUTPATIENT
Start: 2025-02-26 | End: 2026-02-26

## 2025-02-26 RX ORDER — CARVEDILOL 25 MG/1
25 TABLET ORAL 2 TIMES DAILY
Qty: 180 TABLET | Refills: 3 | Status: SHIPPED | OUTPATIENT
Start: 2025-02-26

## 2025-02-26 RX ORDER — LOSARTAN POTASSIUM 50 MG/1
50 TABLET ORAL 2 TIMES DAILY
Qty: 180 TABLET | Refills: 1 | Status: SHIPPED | OUTPATIENT
Start: 2025-02-26

## 2025-02-26 RX ORDER — AMLODIPINE BESYLATE 5 MG/1
5 TABLET ORAL NIGHTLY
Qty: 90 TABLET | Refills: 3 | Status: SHIPPED | OUTPATIENT
Start: 2025-02-26 | End: 2026-02-26

## 2025-02-26 RX ORDER — TAMSULOSIN HYDROCHLORIDE 0.4 MG/1
0.4 CAPSULE ORAL DAILY
Qty: 90 CAPSULE | Refills: 1 | Status: SHIPPED | OUTPATIENT
Start: 2025-02-26

## 2025-02-26 ASSESSMENT — ENCOUNTER SYMPTOMS
OCCASIONAL FEELINGS OF UNSTEADINESS: 1
DEPRESSION: 0
LOSS OF SENSATION IN FEET: 1

## 2025-02-26 ASSESSMENT — PATIENT HEALTH QUESTIONNAIRE - PHQ9
2. FEELING DOWN, DEPRESSED OR HOPELESS: NOT AT ALL
SUM OF ALL RESPONSES TO PHQ9 QUESTIONS 1 AND 2: 0
1. LITTLE INTEREST OR PLEASURE IN DOING THINGS: NOT AT ALL

## 2025-02-26 NOTE — PROGRESS NOTES
"Subjective   Chief complaint: Bladimir Brunner Jr. is a 69 y.o. male who presents for Follow-up (6 month follow-up. Patient is requesting for assistance on losing weight. Patient seen a urologist a couple years ago and was given minerva patient advises that he stop taking due to side effects patient would like discuss starting medication. ).    HPI:  HPI  Chronic disease management.  Active problems noted in [facility].  Complains of rectal dysfunction.  He was on samples in the past.  But that caused him elevated blood pressure.  But now he is change his lifestyle.  His blood pressure is under much better control.  Organ to go back to daily low-dose and Cialis.  Other active problems noted.  Wants to discuss medication for weight loss.  Endocrinology referral provided.  Laboratory assessment reviewed.  Stable.  Blood pressure controlled.  No anginal chest pain.  Breathing stable no worsening of cough sputum.  Reinforced left modifications continue current medical therapy.  Follow-up in 6 months.  Labs to follow-up.  Objective   /80 (BP Location: Left arm, Patient Position: Sitting, BP Cuff Size: Large adult)   Pulse 64   Temp 36.6 °C (97.9 °F) (Temporal)   Ht 1.778 m (5' 10\")   Wt 115 kg (253 lb 3.2 oz)   SpO2 90%   BMI 36.33 kg/m²   Physical Exam  Vitals and nursing note reviewed.   Constitutional:       Appearance: Normal appearance.   HENT:      Head: Normocephalic and atraumatic.   Eyes:      Extraocular Movements: Extraocular movements intact.      Conjunctiva/sclera: Conjunctivae normal.      Pupils: Pupils are equal, round, and reactive to light.   Cardiovascular:      Rate and Rhythm: Normal rate and regular rhythm.      Heart sounds: Normal heart sounds.   Pulmonary:      Effort: Pulmonary effort is normal.      Breath sounds: Normal breath sounds.   Abdominal:      General: Abdomen is flat. Bowel sounds are normal.      Palpations: Abdomen is soft.   Musculoskeletal:         General: Normal range of " motion.   Skin:     General: Skin is warm and dry.   Neurological:      General: No focal deficit present.      Mental Status: He is alert and oriented to person, place, and time. Mental status is at baseline.   Psychiatric:         Mood and Affect: Mood normal.         Behavior: Behavior normal.       Review of Systems   I have reviewed and reconciled the medication list with the patient today.   Current Outpatient Medications:     acetaminophen (Tylenol 8 HOUR) 650 mg ER tablet, TAKE AS NEEDED AS DIRECTED, Disp: , Rfl:     albuterol 90 mcg/actuation inhaler, Inhale 2 puffs every 4 hours if needed for wheezing or shortness of breath., Disp: 18 g, Rfl: 1    budesonide-formoteroL (Symbicort) 160-4.5 mcg/actuation inhaler, Symbicort 160 mcg-4.5 mcg/inh inhalation aerosol  Quantity: 0  Refills: 0  Ordered: 24-Jan-2023 Mary Chang Generic Substitution Allowed, Disp: , Rfl:     budesonide-glycopyr-formoterol (Breztri Aerosphere) 160-9-4.8 mcg/actuation HFA aerosol inhaler, Inhale 2 puffs 2 times a day., Disp: , Rfl:     tiotropium (Spiriva) 18 mcg inhalation capsule, Place 1 capsule (18 mcg) into inhaler and inhale once daily., Disp: , Rfl:     amLODIPine (Norvasc) 5 mg tablet, Take 1 tablet (5 mg) by mouth once daily at bedtime. (Increase in dose), Disp: 90 tablet, Rfl: 3    atorvastatin (Lipitor) 40 mg tablet, Take 1 tablet (40 mg) by mouth once daily at bedtime., Disp: 90 tablet, Rfl: 3    carvedilol (Coreg) 25 mg tablet, Take 1 tablet (25 mg) by mouth 2 times a day., Disp: 180 tablet, Rfl: 3    cetirizine (ZyrTEC) 10 mg tablet, Take 1 tablet (10 mg) by mouth once daily as needed., Disp: , Rfl:     cholecalciferol (Vitamin D-3) 50 mcg (2,000 unit) capsule, TAKE 1 CAPSULE BY MOUTH EVERY DAY, Disp: 90 capsule, Rfl: 1    hydroCHLOROthiazide (HYDRODiuril) 25 mg tablet, Take 1 tablet (25 mg) by mouth once daily., Disp: 90 tablet, Rfl: 3    ibuprofen 200 mg tablet, Take 1-4 tablets (200-800 mg) by mouth 3 times a day as  needed., Disp: , Rfl:     losartan (Cozaar) 50 mg tablet, Take 1 tablet (50 mg) by mouth 2 times a day., Disp: 180 tablet, Rfl: 1    tadalafil (Cialis) 2.5 mg tablet, Take 1 tablet (2.5 mg) by mouth once daily., Disp: 90 tablet, Rfl: 1    tamsulosin (Flomax) 0.4 mg 24 hr capsule, Take 1 capsule (0.4 mg) by mouth once daily., Disp: 90 capsule, Rfl: 1     Imaging:  No results found.     Labs reviewed:    Lab Results   Component Value Date    WBC 6.2 02/11/2025    HGB 16.6 02/11/2025    HCT 49.9 02/11/2025     02/11/2025    CHOL 135 02/11/2025    TRIG 144 02/11/2025    HDL 34 (L) 02/11/2025    ALT 18 02/11/2025    AST 13 02/11/2025     02/11/2025    K 3.9 02/11/2025    CL 96 (L) 02/11/2025    CREATININE 1.05 02/11/2025    BUN 18 02/11/2025    CO2 36 (H) 02/11/2025    TSH 2.65 02/11/2025    PSA 0.68 02/11/2025    INR 1.0 01/24/2023       Assessment/Plan   Problem List Items Addressed This Visit             ICD-10-CM    CAD S/P percutaneous coronary angioplasty I25.10, Z98.61    Relevant Medications    tadalafil (Cialis) 2.5 mg tablet    carvedilol (Coreg) 25 mg tablet    amLODIPine (Norvasc) 5 mg tablet    Other Relevant Orders    Comprehensive metabolic panel    Lipid panel    Chronic obstructive pulmonary disease (Multi) - Primary J44.9    Current every day smoker F17.200    Erectile dysfunction N52.9    Relevant Medications    tadalafil (Cialis) 2.5 mg tablet    Essential hypertension I10    Relevant Medications    hydroCHLOROthiazide (HYDRODiuril) 25 mg tablet    amLODIPine (Norvasc) 5 mg tablet    Other Relevant Orders    Comprehensive metabolic panel    Lipid panel    Mixed hyperlipidemia E78.2    Relevant Medications    atorvastatin (Lipitor) 40 mg tablet    Other Relevant Orders    Comprehensive metabolic panel    Lipid panel    Vitamin D deficiency E55.9    Class 2 obesity with body mass index (BMI) of 35.0 to 35.9 in adult E66.812, Z68.35    Relevant Orders    Referral to Endocrinology     Other  Visit Diagnoses         Codes    Benign localized prostatic hyperplasia with lower urinary tract symptoms (LUTS)     N40.1    Benign prostatic hyperplasia without lower urinary tract symptoms     N40.0    Relevant Medications    tamsulosin (Flomax) 0.4 mg 24 hr capsule    Atherosclerotic heart disease of native coronary artery without angina pectoris     I25.10    Relevant Medications    tadalafil (Cialis) 2.5 mg tablet    carvedilol (Coreg) 25 mg tablet    amLODIPine (Norvasc) 5 mg tablet    Essential (primary) hypertension     I10    Relevant Medications    losartan (Cozaar) 50 mg tablet            Reinforced lifestyle modifications  Continue current medications as listed  Physical activity as tolerated and healthy diet encouraged  Maintain a healthy weight  Follow up in 6mo

## 2025-03-17 ENCOUNTER — HOSPITAL ENCOUNTER (OUTPATIENT)
Dept: CARDIOLOGY | Facility: HOSPITAL | Age: 70
Discharge: HOME | End: 2025-03-17
Payer: COMMERCIAL

## 2025-03-17 DIAGNOSIS — Z95.0 PACEMAKER: ICD-10-CM

## 2025-03-17 PROCEDURE — 93296 REM INTERROG EVL PM/IDS: CPT

## 2025-03-17 PROCEDURE — 93294 REM INTERROG EVL PM/LDLS PM: CPT | Performed by: INTERNAL MEDICINE

## 2025-06-19 ENCOUNTER — HOSPITAL ENCOUNTER (OUTPATIENT)
Dept: CARDIOLOGY | Facility: CLINIC | Age: 70
Discharge: HOME | End: 2025-06-19
Payer: MEDICARE

## 2025-06-19 DIAGNOSIS — Z13.6 ENCOUNTER FOR SCREENING FOR CARDIOVASCULAR DISORDERS: ICD-10-CM

## 2025-06-19 DIAGNOSIS — Z95.828 H/O ENDOVASCULAR STENT GRAFT FOR ABDOMINAL AORTIC ANEURYSM: ICD-10-CM

## 2025-06-19 DIAGNOSIS — Z87.891 PERSONAL HISTORY OF NICOTINE DEPENDENCE: ICD-10-CM

## 2025-06-19 PROCEDURE — 93978 VASCULAR STUDY: CPT

## 2025-06-19 PROCEDURE — 93978 VASCULAR STUDY: CPT | Performed by: INTERNAL MEDICINE

## 2025-06-24 ENCOUNTER — HOSPITAL ENCOUNTER (OUTPATIENT)
Dept: CARDIOLOGY | Facility: HOSPITAL | Age: 70
Discharge: HOME | End: 2025-06-24
Payer: COMMERCIAL

## 2025-06-24 DIAGNOSIS — I44.2 ATRIOVENTRICULAR BLOCK, COMPLETE (MULTI): ICD-10-CM

## 2025-06-24 DIAGNOSIS — Z95.0 PACEMAKER: ICD-10-CM

## 2025-06-24 PROCEDURE — 93296 REM INTERROG EVL PM/IDS: CPT

## 2025-06-24 PROCEDURE — 93294 REM INTERROG EVL PM/LDLS PM: CPT | Performed by: INTERNAL MEDICINE

## 2025-06-27 ENCOUNTER — APPOINTMENT (OUTPATIENT)
Dept: CARDIOLOGY | Facility: CLINIC | Age: 70
End: 2025-06-27
Payer: COMMERCIAL

## 2025-06-27 VITALS
HEIGHT: 69 IN | HEART RATE: 64 BPM | SYSTOLIC BLOOD PRESSURE: 116 MMHG | DIASTOLIC BLOOD PRESSURE: 80 MMHG | BODY MASS INDEX: 36.56 KG/M2 | WEIGHT: 246.8 LBS

## 2025-06-27 DIAGNOSIS — Z98.61 CAD S/P PERCUTANEOUS CORONARY ANGIOPLASTY: ICD-10-CM

## 2025-06-27 DIAGNOSIS — Z82.49 FAMILY HISTORY OF CORONARY ARTERY DISEASE: ICD-10-CM

## 2025-06-27 DIAGNOSIS — I25.2 HISTORY OF MI (MYOCARDIAL INFARCTION): ICD-10-CM

## 2025-06-27 DIAGNOSIS — F17.200 CURRENT EVERY DAY SMOKER: ICD-10-CM

## 2025-06-27 DIAGNOSIS — I87.2 CHRONIC VENOUS INSUFFICIENCY OF LOWER EXTREMITY: ICD-10-CM

## 2025-06-27 DIAGNOSIS — E78.2 MIXED HYPERLIPIDEMIA: ICD-10-CM

## 2025-06-27 DIAGNOSIS — Z85.46 HISTORY OF PROSTATE CANCER: ICD-10-CM

## 2025-06-27 DIAGNOSIS — I71.43 INFRARENAL ABDOMINAL AORTIC ANEURYSM (AAA) WITHOUT RUPTURE: ICD-10-CM

## 2025-06-27 DIAGNOSIS — Z95.0 PACEMAKER: ICD-10-CM

## 2025-06-27 DIAGNOSIS — Z95.828 H/O ENDOVASCULAR STENT GRAFT FOR ABDOMINAL AORTIC ANEURYSM: ICD-10-CM

## 2025-06-27 DIAGNOSIS — J44.9 CHRONIC OBSTRUCTIVE PULMONARY DISEASE, UNSPECIFIED COPD TYPE (MULTI): ICD-10-CM

## 2025-06-27 DIAGNOSIS — I25.10 CAD S/P PERCUTANEOUS CORONARY ANGIOPLASTY: ICD-10-CM

## 2025-06-27 DIAGNOSIS — I10 ESSENTIAL HYPERTENSION: ICD-10-CM

## 2025-06-27 PROBLEM — E66.812 CLASS 2 OBESITY WITH BODY MASS INDEX (BMI) OF 35.0 TO 35.9 IN ADULT: Status: RESOLVED | Noted: 2023-09-16 | Resolved: 2025-06-27

## 2025-06-27 PROCEDURE — 3074F SYST BP LT 130 MM HG: CPT | Performed by: INTERNAL MEDICINE

## 2025-06-27 PROCEDURE — 99214 OFFICE O/P EST MOD 30 MIN: CPT | Performed by: INTERNAL MEDICINE

## 2025-06-27 PROCEDURE — 3079F DIAST BP 80-89 MM HG: CPT | Performed by: INTERNAL MEDICINE

## 2025-06-27 PROCEDURE — 3008F BODY MASS INDEX DOCD: CPT | Performed by: INTERNAL MEDICINE

## 2025-06-27 PROCEDURE — 1159F MED LIST DOCD IN RCRD: CPT | Performed by: INTERNAL MEDICINE

## 2025-06-27 ASSESSMENT — ENCOUNTER SYMPTOMS: DYSPNEA ON EXERTION: 1

## 2025-06-27 NOTE — Clinical Note
After patient left- CDO decided he wanted to repeat abd duplex in 1 year before next visit. Please arrange. Thank you

## 2025-06-27 NOTE — PROGRESS NOTES
CARDIOLOGY OFFICE VISIT      CHIEF COMPLAINT  Chief Complaint   Patient presents with    Follow-up     1 year follow-up for management of CAD, endovascular stent graft for AAA, hypertension & hyperlipidemia. He states he is always tired & sleeps too much. He is here to discuss testing results        HISTORY OF PRESENT ILLNESS  The patient states that he is more fatigued and tired than he would like.  He states that when he coughs a lot he has had a bleed some brief syncopal episodes.  I told him this is due to the coughing.  He does have permanent pacemaker which was just recently analyzed and that looked good.  He is not having any chest discomfort.  He has his usual dyspnea secondary to COPD and continued tobacco abuse.  He is having little bit of swelling in his lower extremities but nothing too severe or bothersome.  He is not having any problem with his current medication.  I did go over his lab work with him from 4 months ago which included CBC, chemistry profile, and lipids.  They were good.  His ultrasound of his abdominal aorta for follow-up of his EVAR was satisfactory.    Impression:  Permanent pacemaker  Coronary artery disease, no angina   Non-STEMI May 2020  PCI with CLARI of circumflex coronary 5/29/2020  Endovascular stent graft repair of infrarenal abdominal aneurysm and right common iliac artery aneurysm on 8/21/2020  Hypertension  Hyperlipidemia  Tobacco abuse  COPD  Chronic venous insufficiency of lower extremities bilaterally  Family history of CAD  Obesity  History of prostate cancer, status post radiation therapy     Please excuse any errors in grammar or translation related to this dictation. Voice recognition software was utilized to prepare this document.     Past Medical History  Medical History[1]    Social History  Social History[2]    Family History   Family History[3]     Allergies:  RX Allergies[4]     Outpatient Medications:  Current Outpatient Medications   Medication Instructions     acetaminophen (Tylenol 8 HOUR) 650 mg ER tablet TAKE AS NEEDED AS DIRECTED    albuterol 90 mcg/actuation inhaler 2 puffs, inhalation, Every 4 hours PRN    amLODIPine (NORVASC) 5 mg, oral, Nightly, (Increase in dose)    atorvastatin (LIPITOR) 40 mg, oral, Nightly    budesonide-formoteroL (Symbicort) 160-4.5 mcg/actuation inhaler Symbicort 160 mcg-4.5 mcg/inh inhalation aerosol   Quantity: 0   Refills: 0   Ordered: 24-Jan-2023  Mary Chang Generic Substitution Allowed    budesonide-glycopyr-formoterol (Breztri Aerosphere) 160-9-4.8 mcg/actuation HFA aerosol inhaler 2 puffs, 2 times daily RT    carvedilol (COREG) 25 mg, oral, 2 times daily    cetirizine (ZyrTEC) 10 mg tablet 1 tablet, Daily PRN    cholecalciferol (Vitamin D-3) 50 mcg (2,000 unit) capsule oral, Daily    hydroCHLOROthiazide (HYDRODIURIL) 25 mg, oral, Daily    ibuprofen 200 mg tablet 1-4 tablets, 3 times daily PRN    losartan (COZAAR) 50 mg, oral, 2 times daily    tadalafil (CIALIS) 2.5 mg, oral, Daily    tamsulosin (FLOMAX) 0.4 mg, oral, Daily    tiotropium (Spiriva) 18 mcg inhalation capsule 1 capsule, Daily RT          REVIEW OF SYSTEMS  Review of Systems   Cardiovascular:  Positive for dyspnea on exertion.   All other systems reviewed and are negative.        VITALS  Vitals:    06/27/25 1005   BP: 116/80   Pulse: 64       PHYSICAL EXAM  Vitals reviewed.   Constitutional:       Appearance: Normal and healthy appearance. Well-developed and not in distress.   Eyes:      Conjunctiva/sclera: Conjunctivae normal.      Pupils: Pupils are equal, round, and reactive to light.   Neck:      Vascular: No JVR. JVD normal.   Pulmonary:      Effort: Pulmonary effort is normal.      Breath sounds: Normal breath sounds. No wheezing. No rhonchi. No rales.   Chest:      Chest wall: Not tender to palpatation.   Cardiovascular:      PMI at left midclavicular line. Normal rate. Regular rhythm. Normal S1. Normal S2.       Murmurs: There is no murmur.      No gallop.  No  click. No rub.   Pulses:     Intact distal pulses.   Edema:     Pretibial: bilateral trace edema of the pretibial area.     Ankle: bilateral trace edema of the ankle.     Feet: bilateral trace edema of the feet.  Abdominal:      Tenderness: There is no abdominal tenderness.   Musculoskeletal: Normal range of motion.         General: No tenderness.      Cervical back: Normal range of motion. Skin:     General: Skin is warm and dry.   Neurological:      General: No focal deficit present.      Mental Status: Alert and oriented to person, place and time.   Psychiatric:         Behavior: Behavior is cooperative.           ASSESSMENT AND PLAN  Diagnoses and all orders for this visit:  Pacemaker  CAD S/P percutaneous coronary angioplasty  History of MI (myocardial infarction)  H/O endovascular stent graft for abdominal aortic aneurysm  Essential hypertension  Mixed hyperlipidemia  Chronic obstructive pulmonary disease, unspecified COPD type (Multi)  Current every day smoker  History of prostate cancer  BMI 36.0-36.9,adult  Family history of coronary artery disease  Chronic venous insufficiency of lower extremity  Infrarenal abdominal aortic aneurysm (AAA) without rupture        IHans RN   am scribing for, and in the presence of Dr. Agustin Martinez MD  .    I, Dr. Agustin Martinez MD  , personally performed the services described in the documentation as scribed by Hans Perez RN   in my presence, and confirm it is both accurate and complete.      Dr. Agustin Martinez MD  Thank you for allowing me to participate in the care of this patient. Please do not hesitate to contact me with any further questions or concerns.         [1]   Past Medical History:  Diagnosis Date    Encounter for general adult medical examination without abnormal findings     Encounter for wellness examination    Encounter for general adult medical examination without abnormal findings 08/08/2022    Encounter for wellness  examination    Encounter for immunization     Encounter for immunization    Encounter for routine child health examination without abnormal findings     Well child examination    Encounter for screening for malignant neoplasm of colon     Screening for colon cancer    Encounter for screening for malignant neoplasm of colon     Colon cancer screening    Encounter for screening for malignant neoplasm of prostate     Screening PSA (prostate specific antigen)    Hyperventilation 05/13/2022    Labored breathing    Obesity, unspecified 10/12/2022    Class 1 obesity with body mass index (BMI) of 34.0 to 34.9 in adult    Pain in right hip 04/04/2022    Hip pain, bilateral    Pain in right knee 03/16/2022    Pain in both knees    Patient's noncompliance with other medical treatment and regimen due to unspecified reason     Noncompliance with treatment    Patient's noncompliance with other medical treatment and regimen due to unspecified reason 05/16/2022    Noncompliance by declining service    Personal history of malignant neoplasm of prostate     History of malignant neoplasm of prostate    Personal history of malignant neoplasm of prostate 03/16/2022    History of prostate cancer    Personal history of other diseases of the circulatory system     History of second degree heart block    Personal history of other diseases of the circulatory system 04/11/2022    History of uncontrolled hypertension    Personal history of other endocrine, nutritional and metabolic disease 03/16/2022    History of obesity    Personal history of other specified conditions 06/03/2022    History of syncope   [2]   Social History  Tobacco Use    Smoking status: Every Day     Current packs/day: 1.00     Average packs/day: 1 pack/day for 55.5 years (55.5 ttl pk-yrs)     Types: Cigarettes     Start date: 1970     Passive exposure: Current    Smokeless tobacco: Never   Vaping Use    Vaping status: Never Used   Substance Use Topics    Alcohol use: Yes      Comment: socially 1-2x week    Drug use: Never   [3]   Family History  Problem Relation Name Age of Onset    Coronary artery disease Mother      Other (Cardiac Pacemaker) Mother      Coronary artery disease Father      Other (pacemaker) Father      Coronary artery disease Sister     [4] No Known Allergies

## 2025-06-27 NOTE — PATIENT INSTRUCTIONS
Patient to follow up in 1 year with Dr. Agustin Edmondson MD      Will repeat surveillance Abdominal US for your aneurysm repair again in 1 year before next visit.   Continue same medications and treatments.   Patient educated on proper medication use.   Patient educated on risk factor modification.   Please bring any lab results from other providers / physicians to your next appointment.     Please bring all medicines, vitamins, and herbal supplements with you when you come to the office.     Prescriptions will not be filled unless you are compliant with your follow up appointments or have a follow up appointment scheduled as per instruction of your physician. Refills should be requested at the time of your visit.    Hans NICE RN am scribing for and in the presence of Dr. Agustin Murphy MD

## 2025-12-10 ENCOUNTER — APPOINTMENT (OUTPATIENT)
Dept: CARDIOLOGY | Facility: CLINIC | Age: 70
End: 2025-12-10
Payer: MEDICARE

## 2026-06-26 ENCOUNTER — APPOINTMENT (OUTPATIENT)
Dept: CARDIOLOGY | Facility: CLINIC | Age: 71
End: 2026-06-26
Payer: COMMERCIAL